# Patient Record
Sex: MALE | Race: OTHER | HISPANIC OR LATINO
[De-identification: names, ages, dates, MRNs, and addresses within clinical notes are randomized per-mention and may not be internally consistent; named-entity substitution may affect disease eponyms.]

---

## 2017-07-05 ENCOUNTER — APPOINTMENT (OUTPATIENT)
Dept: OTHER | Facility: CLINIC | Age: 62
End: 2017-07-05

## 2017-07-05 VITALS
HEART RATE: 76 BPM | OXYGEN SATURATION: 96 % | BODY MASS INDEX: 30.53 KG/M2 | SYSTOLIC BLOOD PRESSURE: 99 MMHG | DIASTOLIC BLOOD PRESSURE: 66 MMHG | WEIGHT: 190 LBS | HEIGHT: 66 IN

## 2017-07-06 LAB
ALBUMIN SERPL ELPH-MCNC: 4.6 G/DL
ALP BLD-CCNC: 54 U/L
ALT SERPL-CCNC: 31 U/L
ANION GAP SERPL CALC-SCNC: 16 MMOL/L
APPEARANCE: CLEAR
AST SERPL-CCNC: 18 U/L
BASOPHILS # BLD AUTO: 0.01 K/UL
BASOPHILS NFR BLD AUTO: 0.2 %
BILIRUB SERPL-MCNC: 0.5 MG/DL
BILIRUBIN URINE: NEGATIVE
BLOOD URINE: NEGATIVE
BUN SERPL-MCNC: 11 MG/DL
CALCIUM SERPL-MCNC: 9.3 MG/DL
CHLORIDE SERPL-SCNC: 101 MMOL/L
CHOLEST SERPL-MCNC: 126 MG/DL
CHOLEST/HDLC SERPL: 2.9 RATIO
CO2 SERPL-SCNC: 22 MMOL/L
COLOR: YELLOW
CREAT SERPL-MCNC: 0.73 MG/DL
EOSINOPHIL # BLD AUTO: 0.08 K/UL
EOSINOPHIL NFR BLD AUTO: 1.4 %
GLUCOSE QUALITATIVE U: 1000 MG/DL
GLUCOSE SERPL-MCNC: 231 MG/DL
HCT VFR BLD CALC: 43.7 %
HDLC SERPL-MCNC: 43 MG/DL
HGB BLD-MCNC: 14.7 G/DL
IMM GRANULOCYTES NFR BLD AUTO: 0.2 %
KETONES URINE: NEGATIVE
LDLC SERPL CALC-MCNC: 56 MG/DL
LEUKOCYTE ESTERASE URINE: NEGATIVE
LYMPHOCYTES # BLD AUTO: 1.91 K/UL
LYMPHOCYTES NFR BLD AUTO: 33.1 %
MAN DIFF?: NORMAL
MCHC RBC-ENTMCNC: 29.5 PG
MCHC RBC-ENTMCNC: 33.6 GM/DL
MCV RBC AUTO: 87.6 FL
MONOCYTES # BLD AUTO: 0.39 K/UL
MONOCYTES NFR BLD AUTO: 6.8 %
NEUTROPHILS # BLD AUTO: 3.37 K/UL
NEUTROPHILS NFR BLD AUTO: 58.3 %
NITRITE URINE: NEGATIVE
PH URINE: 6
PLATELET # BLD AUTO: 293 K/UL
POTASSIUM SERPL-SCNC: 4.2 MMOL/L
PROT SERPL-MCNC: 7.5 G/DL
PROTEIN URINE: NEGATIVE MG/DL
RBC # BLD: 4.99 M/UL
RBC # FLD: 12.8 %
SODIUM SERPL-SCNC: 139 MMOL/L
SPECIFIC GRAVITY URINE: 1.02
TRIGL SERPL-MCNC: 136 MG/DL
UROBILINOGEN URINE: NORMAL MG/DL
WBC # FLD AUTO: 5.77 K/UL

## 2017-10-04 ENCOUNTER — APPOINTMENT (OUTPATIENT)
Dept: OTHER | Facility: CLINIC | Age: 62
End: 2017-10-04
Payer: COMMERCIAL

## 2017-10-04 VITALS
TEMPERATURE: 97.7 F | OXYGEN SATURATION: 98 % | BODY MASS INDEX: 29.89 KG/M2 | HEART RATE: 67 BPM | DIASTOLIC BLOOD PRESSURE: 67 MMHG | SYSTOLIC BLOOD PRESSURE: 96 MMHG | WEIGHT: 186 LBS | HEIGHT: 66 IN

## 2017-10-04 PROCEDURE — 99214 OFFICE O/P EST MOD 30 MIN: CPT | Mod: 25

## 2017-10-04 PROCEDURE — 90686 IIV4 VACC NO PRSV 0.5 ML IM: CPT

## 2017-10-04 PROCEDURE — G0008: CPT

## 2018-01-17 ENCOUNTER — APPOINTMENT (OUTPATIENT)
Dept: OTHER | Facility: CLINIC | Age: 63
End: 2018-01-17
Payer: COMMERCIAL

## 2018-01-17 VITALS
HEART RATE: 73 BPM | DIASTOLIC BLOOD PRESSURE: 68 MMHG | RESPIRATION RATE: 14 BRPM | OXYGEN SATURATION: 99 % | SYSTOLIC BLOOD PRESSURE: 102 MMHG | BODY MASS INDEX: 28.61 KG/M2 | WEIGHT: 178 LBS | HEIGHT: 66 IN

## 2018-01-17 PROCEDURE — 99213 OFFICE O/P EST LOW 20 MIN: CPT

## 2018-05-31 ENCOUNTER — APPOINTMENT (OUTPATIENT)
Dept: OTHER | Facility: CLINIC | Age: 63
End: 2018-05-31
Payer: COMMERCIAL

## 2018-05-31 VITALS
BODY MASS INDEX: 28.28 KG/M2 | OXYGEN SATURATION: 99 % | DIASTOLIC BLOOD PRESSURE: 64 MMHG | SYSTOLIC BLOOD PRESSURE: 98 MMHG | HEART RATE: 74 BPM | HEIGHT: 66 IN | WEIGHT: 176 LBS | RESPIRATION RATE: 14 BRPM

## 2018-05-31 PROCEDURE — 99214 OFFICE O/P EST MOD 30 MIN: CPT

## 2018-08-07 ENCOUNTER — EMERGENCY (EMERGENCY)
Facility: HOSPITAL | Age: 63
LOS: 1 days | Discharge: HOME | End: 2018-08-07
Attending: EMERGENCY MEDICINE
Payer: COMMERCIAL

## 2018-08-07 VITALS
RESPIRATION RATE: 18 BRPM | OXYGEN SATURATION: 100 % | SYSTOLIC BLOOD PRESSURE: 103 MMHG | DIASTOLIC BLOOD PRESSURE: 55 MMHG | HEART RATE: 81 BPM | TEMPERATURE: 98 F

## 2018-08-07 VITALS
SYSTOLIC BLOOD PRESSURE: 99 MMHG | DIASTOLIC BLOOD PRESSURE: 65 MMHG | WEIGHT: 175.05 LBS | HEART RATE: 84 BPM | OXYGEN SATURATION: 97 % | HEIGHT: 66 IN | RESPIRATION RATE: 18 BRPM | TEMPERATURE: 99 F

## 2018-08-07 LAB
ALBUMIN SERPL ELPH-MCNC: 3.4 G/DL — LOW (ref 3.5–5)
ALP SERPL-CCNC: 65 U/L — SIGNIFICANT CHANGE UP (ref 40–120)
ALT FLD-CCNC: 30 U/L DA — SIGNIFICANT CHANGE UP (ref 10–60)
ANION GAP SERPL CALC-SCNC: 11 MMOL/L — SIGNIFICANT CHANGE UP (ref 5–17)
APPEARANCE UR: CLEAR — SIGNIFICANT CHANGE UP
AST SERPL-CCNC: 19 U/L — SIGNIFICANT CHANGE UP (ref 10–40)
BILIRUB SERPL-MCNC: 0.5 MG/DL — SIGNIFICANT CHANGE UP (ref 0.2–1.2)
BILIRUB UR-MCNC: NEGATIVE — SIGNIFICANT CHANGE UP
BUN SERPL-MCNC: 11 MG/DL — SIGNIFICANT CHANGE UP (ref 7–18)
CALCIUM SERPL-MCNC: 8.7 MG/DL — SIGNIFICANT CHANGE UP (ref 8.4–10.5)
CHLORIDE SERPL-SCNC: 103 MMOL/L — SIGNIFICANT CHANGE UP (ref 96–108)
CO2 SERPL-SCNC: 19 MMOL/L — LOW (ref 22–31)
COLOR SPEC: YELLOW — SIGNIFICANT CHANGE UP
CREAT SERPL-MCNC: 0.82 MG/DL — SIGNIFICANT CHANGE UP (ref 0.5–1.3)
DIFF PNL FLD: ABNORMAL
GLUCOSE SERPL-MCNC: 143 MG/DL — HIGH (ref 70–99)
GLUCOSE UR QL: 1000 MG/DL
HCT VFR BLD CALC: 48.4 % — SIGNIFICANT CHANGE UP (ref 39–50)
HGB BLD-MCNC: 16.7 G/DL — SIGNIFICANT CHANGE UP (ref 13–17)
KETONES UR-MCNC: ABNORMAL
LEUKOCYTE ESTERASE UR-ACNC: NEGATIVE — SIGNIFICANT CHANGE UP
LIDOCAIN IGE QN: 238 U/L — SIGNIFICANT CHANGE UP (ref 73–393)
LYMPHOCYTES # BLD AUTO: 12 % — LOW (ref 13–44)
MAGNESIUM SERPL-MCNC: 2.1 MG/DL — SIGNIFICANT CHANGE UP (ref 1.6–2.6)
MCHC RBC-ENTMCNC: 30 PG — SIGNIFICANT CHANGE UP (ref 27–34)
MCHC RBC-ENTMCNC: 34.4 GM/DL — SIGNIFICANT CHANGE UP (ref 32–36)
MCV RBC AUTO: 87.1 FL — SIGNIFICANT CHANGE UP (ref 80–100)
MONOCYTES NFR BLD AUTO: 8 % — SIGNIFICANT CHANGE UP (ref 2–14)
NEUTROPHILS NFR BLD AUTO: 49 % — SIGNIFICANT CHANGE UP (ref 43–77)
NITRITE UR-MCNC: NEGATIVE — SIGNIFICANT CHANGE UP
PH UR: 6 — SIGNIFICANT CHANGE UP (ref 5–8)
PLATELET # BLD AUTO: 222 K/UL — SIGNIFICANT CHANGE UP (ref 150–400)
POTASSIUM SERPL-MCNC: 2.9 MMOL/L — CRITICAL LOW (ref 3.5–5.3)
POTASSIUM SERPL-SCNC: 2.9 MMOL/L — CRITICAL LOW (ref 3.5–5.3)
PROT SERPL-MCNC: 8 G/DL — SIGNIFICANT CHANGE UP (ref 6–8.3)
PROT UR-MCNC: 30 MG/DL
RBC # BLD: 5.56 M/UL — SIGNIFICANT CHANGE UP (ref 4.2–5.8)
RBC # FLD: 11.4 % — SIGNIFICANT CHANGE UP (ref 10.3–14.5)
SODIUM SERPL-SCNC: 133 MMOL/L — LOW (ref 135–145)
SP GR SPEC: 1.01 — SIGNIFICANT CHANGE UP (ref 1.01–1.02)
UROBILINOGEN FLD QL: NEGATIVE — SIGNIFICANT CHANGE UP
WBC # BLD: 8.4 K/UL — SIGNIFICANT CHANGE UP (ref 3.8–10.5)
WBC # FLD AUTO: 8.4 K/UL — SIGNIFICANT CHANGE UP (ref 3.8–10.5)

## 2018-08-07 PROCEDURE — 74177 CT ABD & PELVIS W/CONTRAST: CPT

## 2018-08-07 PROCEDURE — 82962 GLUCOSE BLOOD TEST: CPT

## 2018-08-07 PROCEDURE — 99284 EMERGENCY DEPT VISIT MOD MDM: CPT

## 2018-08-07 PROCEDURE — 99284 EMERGENCY DEPT VISIT MOD MDM: CPT | Mod: 25

## 2018-08-07 PROCEDURE — 96374 THER/PROPH/DIAG INJ IV PUSH: CPT | Mod: XU

## 2018-08-07 PROCEDURE — 83690 ASSAY OF LIPASE: CPT

## 2018-08-07 PROCEDURE — 93005 ELECTROCARDIOGRAM TRACING: CPT

## 2018-08-07 PROCEDURE — 81001 URINALYSIS AUTO W/SCOPE: CPT

## 2018-08-07 PROCEDURE — 96375 TX/PRO/DX INJ NEW DRUG ADDON: CPT

## 2018-08-07 PROCEDURE — 74177 CT ABD & PELVIS W/CONTRAST: CPT | Mod: 26

## 2018-08-07 PROCEDURE — 83735 ASSAY OF MAGNESIUM: CPT

## 2018-08-07 PROCEDURE — 80053 COMPREHEN METABOLIC PANEL: CPT

## 2018-08-07 PROCEDURE — 85027 COMPLETE CBC AUTOMATED: CPT

## 2018-08-07 RX ORDER — CIPROFLOXACIN LACTATE 400MG/40ML
400 VIAL (ML) INTRAVENOUS ONCE
Qty: 0 | Refills: 0 | Status: COMPLETED | OUTPATIENT
Start: 2018-08-07 | End: 2018-08-07

## 2018-08-07 RX ORDER — POTASSIUM CHLORIDE 20 MEQ
10 PACKET (EA) ORAL ONCE
Qty: 0 | Refills: 0 | Status: COMPLETED | OUTPATIENT
Start: 2018-08-07 | End: 2018-08-07

## 2018-08-07 RX ORDER — METRONIDAZOLE 500 MG
500 TABLET ORAL ONCE
Qty: 0 | Refills: 0 | Status: COMPLETED | OUTPATIENT
Start: 2018-08-07 | End: 2018-08-07

## 2018-08-07 RX ORDER — POTASSIUM CHLORIDE 20 MEQ
40 PACKET (EA) ORAL ONCE
Qty: 0 | Refills: 0 | Status: COMPLETED | OUTPATIENT
Start: 2018-08-07 | End: 2018-08-07

## 2018-08-07 RX ORDER — SODIUM CHLORIDE 9 MG/ML
1000 INJECTION INTRAMUSCULAR; INTRAVENOUS; SUBCUTANEOUS ONCE
Qty: 0 | Refills: 0 | Status: COMPLETED | OUTPATIENT
Start: 2018-08-07 | End: 2018-08-07

## 2018-08-07 RX ORDER — METRONIDAZOLE 500 MG
1 TABLET ORAL
Qty: 21 | Refills: 0 | OUTPATIENT
Start: 2018-08-07 | End: 2018-08-13

## 2018-08-07 RX ORDER — MOXIFLOXACIN HYDROCHLORIDE TABLETS, 400 MG 400 MG/1
1 TABLET, FILM COATED ORAL
Qty: 14 | Refills: 0 | OUTPATIENT
Start: 2018-08-07 | End: 2018-08-13

## 2018-08-07 RX ADMIN — Medication 100 MILLIEQUIVALENT(S): at 13:08

## 2018-08-07 RX ADMIN — Medication 40 MILLIEQUIVALENT(S): at 13:08

## 2018-08-07 RX ADMIN — Medication 100 MILLIGRAM(S): at 14:43

## 2018-08-07 RX ADMIN — SODIUM CHLORIDE 1000 MILLILITER(S): 9 INJECTION INTRAMUSCULAR; INTRAVENOUS; SUBCUTANEOUS at 13:08

## 2018-08-07 RX ADMIN — Medication 10 MILLIEQUIVALENT(S): at 14:15

## 2018-08-07 RX ADMIN — SODIUM CHLORIDE 1000 MILLILITER(S): 9 INJECTION INTRAMUSCULAR; INTRAVENOUS; SUBCUTANEOUS at 11:28

## 2018-08-07 RX ADMIN — Medication 200 MILLIGRAM(S): at 14:00

## 2018-08-07 NOTE — ED PROVIDER NOTE - MEDICAL DECISION MAKING DETAILS
63 y/o M pt presents with diarrhea, weakness and abd pain. Will order CT, labs, fluids and reassess.

## 2018-08-07 NOTE — ED PROVIDER NOTE - OBJECTIVE STATEMENT
61 y/o M pt with no significant PMHx and no significant PSHx presents to the ED c/o copious non-bloody diarrhea x4 days. Pt was at [facility] a few days ago where he had a blood test and CXR with negative results. Pt reports associated weakness and abd pain with fever at home x a few days ago; no fever x today. Today, pt nearly fainted 2/2 his weakness, so he came to the ED. Pt denies recent travel, nausea, vomiting, melena or any other complaints. NKDA.

## 2018-08-07 NOTE — ED ADULT NURSE NOTE - CHIEF COMPLAINT QUOTE
biba s/p syncopal episode at work , pt reports diarrhea , fevers x 3 days , seen at [facility] 3 days ago for same . states did not feel better . Ems reports  pt hypotensive  at the scene

## 2018-08-07 NOTE — ED ADULT TRIAGE NOTE - CHIEF COMPLAINT QUOTE
biba s/p syncopal episode at work , pt reports diarrhea , fevers x 3 days , seen at [facility] 3 days ago for same . states did not feel better . Ems reports  pt hypotensive  at the scene

## 2018-09-14 ENCOUNTER — EMERGENCY (EMERGENCY)
Facility: HOSPITAL | Age: 63
LOS: 1 days | Discharge: HOME | End: 2018-09-14
Attending: EMERGENCY MEDICINE
Payer: COMMERCIAL

## 2018-09-14 VITALS
OXYGEN SATURATION: 97 % | TEMPERATURE: 98 F | WEIGHT: 175.05 LBS | SYSTOLIC BLOOD PRESSURE: 106 MMHG | HEART RATE: 77 BPM | DIASTOLIC BLOOD PRESSURE: 72 MMHG | HEIGHT: 66 IN | RESPIRATION RATE: 18 BRPM

## 2018-09-14 VITALS — WEIGHT: 175.05 LBS | HEIGHT: 66 IN

## 2018-09-14 DIAGNOSIS — Z98.890 OTHER SPECIFIED POSTPROCEDURAL STATES: Chronic | ICD-10-CM

## 2018-09-14 LAB
ALBUMIN SERPL ELPH-MCNC: 4.2 G/DL — SIGNIFICANT CHANGE UP (ref 3.3–5)
ALP SERPL-CCNC: 69 U/L — SIGNIFICANT CHANGE UP (ref 40–120)
ALT FLD-CCNC: 12 U/L — SIGNIFICANT CHANGE UP (ref 10–45)
ANION GAP SERPL CALC-SCNC: 12 MMOL/L — SIGNIFICANT CHANGE UP (ref 5–17)
APTT BLD: 30.6 SEC — SIGNIFICANT CHANGE UP (ref 27.5–37.4)
AST SERPL-CCNC: 13 U/L — SIGNIFICANT CHANGE UP (ref 10–40)
BASOPHILS # BLD AUTO: 0 K/UL — SIGNIFICANT CHANGE UP (ref 0–0.2)
BASOPHILS NFR BLD AUTO: 0.1 % — SIGNIFICANT CHANGE UP (ref 0–2)
BILIRUB SERPL-MCNC: 0.3 MG/DL — SIGNIFICANT CHANGE UP (ref 0.2–1.2)
BUN SERPL-MCNC: 15 MG/DL — SIGNIFICANT CHANGE UP (ref 7–23)
CALCIUM SERPL-MCNC: 9.6 MG/DL — SIGNIFICANT CHANGE UP (ref 8.4–10.5)
CHLORIDE SERPL-SCNC: 101 MMOL/L — SIGNIFICANT CHANGE UP (ref 96–108)
CO2 SERPL-SCNC: 20 MMOL/L — LOW (ref 22–31)
CREAT SERPL-MCNC: 0.93 MG/DL — SIGNIFICANT CHANGE UP (ref 0.5–1.3)
EOSINOPHIL # BLD AUTO: 0.1 K/UL — SIGNIFICANT CHANGE UP (ref 0–0.5)
EOSINOPHIL NFR BLD AUTO: 1.2 % — SIGNIFICANT CHANGE UP (ref 0–6)
GAS PNL BLDV: SIGNIFICANT CHANGE UP
GLUCOSE SERPL-MCNC: 109 MG/DL — HIGH (ref 70–99)
HCT VFR BLD CALC: 42.8 % — SIGNIFICANT CHANGE UP (ref 39–50)
HCT VFR BLD CALC: 43.6 % — SIGNIFICANT CHANGE UP (ref 39–50)
HGB BLD-MCNC: 14.6 G/DL — SIGNIFICANT CHANGE UP (ref 13–17)
HGB BLD-MCNC: 14.8 G/DL — SIGNIFICANT CHANGE UP (ref 13–17)
INR BLD: 1.19 RATIO — HIGH (ref 0.88–1.16)
LYMPHOCYTES # BLD AUTO: 3 K/UL — SIGNIFICANT CHANGE UP (ref 1–3.3)
LYMPHOCYTES # BLD AUTO: 30.6 % — SIGNIFICANT CHANGE UP (ref 13–44)
MCHC RBC-ENTMCNC: 29.7 PG — SIGNIFICANT CHANGE UP (ref 27–34)
MCHC RBC-ENTMCNC: 30.5 PG — SIGNIFICANT CHANGE UP (ref 27–34)
MCHC RBC-ENTMCNC: 33.6 GM/DL — SIGNIFICANT CHANGE UP (ref 32–36)
MCHC RBC-ENTMCNC: 34.6 GM/DL — SIGNIFICANT CHANGE UP (ref 32–36)
MCV RBC AUTO: 88.2 FL — SIGNIFICANT CHANGE UP (ref 80–100)
MCV RBC AUTO: 88.5 FL — SIGNIFICANT CHANGE UP (ref 80–100)
MONOCYTES # BLD AUTO: 0.6 K/UL — SIGNIFICANT CHANGE UP (ref 0–0.9)
MONOCYTES NFR BLD AUTO: 6.5 % — SIGNIFICANT CHANGE UP (ref 2–14)
NEUTROPHILS # BLD AUTO: 6 K/UL — SIGNIFICANT CHANGE UP (ref 1.8–7.4)
NEUTROPHILS NFR BLD AUTO: 61.6 % — SIGNIFICANT CHANGE UP (ref 43–77)
PLATELET # BLD AUTO: 336 K/UL — SIGNIFICANT CHANGE UP (ref 150–400)
PLATELET # BLD AUTO: 350 K/UL — SIGNIFICANT CHANGE UP (ref 150–400)
POTASSIUM SERPL-MCNC: 3.7 MMOL/L — SIGNIFICANT CHANGE UP (ref 3.5–5.3)
POTASSIUM SERPL-SCNC: 3.7 MMOL/L — SIGNIFICANT CHANGE UP (ref 3.5–5.3)
PROT SERPL-MCNC: 7.7 G/DL — SIGNIFICANT CHANGE UP (ref 6–8.3)
PROTHROM AB SERPL-ACNC: 13 SEC — HIGH (ref 9.8–12.7)
RBC # BLD: 4.85 M/UL — SIGNIFICANT CHANGE UP (ref 4.2–5.8)
RBC # BLD: 4.92 M/UL — SIGNIFICANT CHANGE UP (ref 4.2–5.8)
RBC # FLD: 12.8 % — SIGNIFICANT CHANGE UP (ref 10.3–14.5)
RBC # FLD: 12.9 % — SIGNIFICANT CHANGE UP (ref 10.3–14.5)
SODIUM SERPL-SCNC: 133 MMOL/L — LOW (ref 135–145)
WBC # BLD: 8.9 K/UL — SIGNIFICANT CHANGE UP (ref 3.8–10.5)
WBC # BLD: 9.7 K/UL — SIGNIFICANT CHANGE UP (ref 3.8–10.5)
WBC # FLD AUTO: 8.9 K/UL — SIGNIFICANT CHANGE UP (ref 3.8–10.5)
WBC # FLD AUTO: 9.7 K/UL — SIGNIFICANT CHANGE UP (ref 3.8–10.5)

## 2018-09-14 PROCEDURE — 85730 THROMBOPLASTIN TIME PARTIAL: CPT

## 2018-09-14 PROCEDURE — 82947 ASSAY GLUCOSE BLOOD QUANT: CPT

## 2018-09-14 PROCEDURE — 82803 BLOOD GASES ANY COMBINATION: CPT

## 2018-09-14 PROCEDURE — 84295 ASSAY OF SERUM SODIUM: CPT

## 2018-09-14 PROCEDURE — 99284 EMERGENCY DEPT VISIT MOD MDM: CPT

## 2018-09-14 PROCEDURE — 82435 ASSAY OF BLOOD CHLORIDE: CPT

## 2018-09-14 PROCEDURE — 82330 ASSAY OF CALCIUM: CPT

## 2018-09-14 PROCEDURE — 99283 EMERGENCY DEPT VISIT LOW MDM: CPT

## 2018-09-14 PROCEDURE — 83605 ASSAY OF LACTIC ACID: CPT

## 2018-09-14 PROCEDURE — 85014 HEMATOCRIT: CPT

## 2018-09-14 PROCEDURE — 85027 COMPLETE CBC AUTOMATED: CPT

## 2018-09-14 PROCEDURE — 85610 PROTHROMBIN TIME: CPT

## 2018-09-14 PROCEDURE — 80053 COMPREHEN METABOLIC PANEL: CPT

## 2018-09-14 PROCEDURE — 84132 ASSAY OF SERUM POTASSIUM: CPT

## 2018-09-14 NOTE — ED PROVIDER NOTE - OBJECTIVE STATEMENT
62M with DMII and prostate cancer (not treated) presenting with 1 episode of painless rectal bleeding today. When asked to quantify it, patient said "a lot" and it filled the bowl. Patient has never had this happen before. For the past month, patient has been having watery stools. Around a month ago, patient had an episode of multiple episodes of diarrhea, which lead to lightheadedness and syncope. Since then, patient has only had brown, watery stools. Patient had a colonoscopy 3 years ago which showed diverticulosis. Patient is scheduled to get another one next week. Denies abdominal pain, nausea, vomiting, constipation, iron supplements, hx of hemorrhoids, fever, chills, dysuria. Patient has been having frequency for the past few months, but no dysuria or hematuria. 62M with DMII and prostate cancer (not treated) presenting with 1 episode of painless rectal bleeding today. When asked to quantify it, patient said "a lot" and it filled the bowl. Patient has never had this happen before. For the past month, patient has been having watery stools. Around a month ago, patient had an episode of multiple episodes of diarrhea, which lead to lightheadedness and syncope. Since then, patient has only had brown, watery stools. Patient had a colonoscopy 3 years ago which showed diverticulosis. Patient is scheduled to get another one next week. Denies abdominal pain, nausea, vomiting, constipation, iron supplements, hx of hemorrhoids, fever, chills, dysuria, family hx of colon or rectal cancer. Patient has been having frequency for the past few months, but no dysuria or hematuria.

## 2018-09-14 NOTE — ED ADULT NURSE NOTE - OBJECTIVE STATEMENT
2045 pt 62m aox3 c/o blood in stool x 1 time today only, denies being on blood thinners, no complaints of pain,able to verbalize concerns no distress at this time/shailesh

## 2018-09-14 NOTE — ED PROVIDER NOTE - PLAN OF CARE
Thank you for visiting our Emergency Department.    Your diagnosis: diverticulosis    Discharge instructions:    - Please follow-up with your Primary Care Doctor in 2-3 days.     - Take any prescribed medications as instructed:    - Others:    - Be sure to return to the ED if you develop new or worsening symptoms.    - Specific signs and symptoms to be vigilant of: worsening abdominal pain, pain that radiates to the back or groin, pain that turns from vague to sharp, associated nausea or vomiting, worsening diarrhea or constipation, blood in the stool, black, tarry stools, excessive vomiting, blood or bile in the vomit, pain associated with lightheadedness, shortness of breath or chest pain.

## 2018-09-14 NOTE — ED PROVIDER NOTE - NS ED ROS FT
General: no fever, chills  HEENT: no headache, throat, ear or eye pain, cough, congestion  Cardiovascular: no chest pain, palpitations  Pulmonary: no shortness of breath, wheezing  Gastrointestinal: + bright red blood in the stool; no abdominal pain, nausea, vomiting, diarrhea, constipation  Neurologic: no headache, lightheadedness  MSK: no joint or muscle pain  Skin: no rashes  Genitourinary: no pain on urination, frequency, urgency, retention, hematuria  Heme: no active bleeding

## 2018-09-14 NOTE — ED PROVIDER NOTE - MEDICAL DECISION MAKING DETAILS
62M with DMII and prostate cancer (not treated) presenting with 1 episode of painless rectal bleeding today. Likely lower GI bleed, ddx includes diverticulosis, diverticulitis, colon cancer.

## 2018-09-14 NOTE — ED PROVIDER NOTE - PROGRESS NOTE DETAILS
Repeat HGB unchanged. No addt'l episodes of BRBPR. Well and comfortable. Suitable for DC with out-patient GI follow-up.

## 2018-09-14 NOTE — ED PROVIDER NOTE - CARE PLAN
Principal Discharge DX:	Diverticulosis  Assessment and plan of treatment:	Thank you for visiting our Emergency Department.    Your diagnosis: diverticulosis    Discharge instructions:    - Please follow-up with your Primary Care Doctor in 2-3 days.     - Take any prescribed medications as instructed:    - Others:    - Be sure to return to the ED if you develop new or worsening symptoms.    - Specific signs and symptoms to be vigilant of: worsening abdominal pain, pain that radiates to the back or groin, pain that turns from vague to sharp, associated nausea or vomiting, worsening diarrhea or constipation, blood in the stool, black, tarry stools, excessive vomiting, blood or bile in the vomit, pain associated with lightheadedness, shortness of breath or chest pain. Principal Discharge DX:	Bright red blood per rectum  Assessment and plan of treatment:	Thank you for visiting our Emergency Department.    Your diagnosis: diverticulosis    Discharge instructions:    - Please follow-up with your Primary Care Doctor in 2-3 days.     - Take any prescribed medications as instructed:    - Others:    - Be sure to return to the ED if you develop new or worsening symptoms.    - Specific signs and symptoms to be vigilant of: worsening abdominal pain, pain that radiates to the back or groin, pain that turns from vague to sharp, associated nausea or vomiting, worsening diarrhea or constipation, blood in the stool, black, tarry stools, excessive vomiting, blood or bile in the vomit, pain associated with lightheadedness, shortness of breath or chest pain.  Secondary Diagnosis:	Diverticulosis

## 2018-09-14 NOTE — ED PROVIDER NOTE - ATTENDING CONTRIBUTION TO CARE
63 y/o male with the above documented history and HPI who on exam appears well and comfortable. VSs noted, sclerae anicteric, MM's moist, neck supple, lungs CTA, cardiac sounds s/ audible m/r/g, abdomen soft, NT/ND, rectal as per Dr. Agee, extremities s/ asymmetry, skin s/ rash or jaundice and neurologically intact. Screening labs unremarkable. In light of his reported h/o diverticulosis we will trend his HGB. If his HGB is stable and he had no additional episodes of BRBPR, he should be suitable for DC to f/u with his own gastroenterologist. We will follow his repeat HGB, continue to observe, reassess and treat/dispo accordingly.

## 2018-09-14 NOTE — ED PROVIDER NOTE - PHYSICAL EXAMINATION
General: NAD, well-appearing, awake and responsive  HEENT: Airway patent, mucous membranes moist  Cardiovascular: RRR, S1 and S2 appreciable, no murmurs  Pulmonary: CTAB, no crackles, rales, rhonchi, or wheezing  Gastrointestinal: epigastric tenderness; abdomen nondistended, soft, no guarding or rebound tenderness, +BS; no external hemorrhoids on rectal exam  Neurologic: Alert and oriented, PERRLA, EOMI, CN II - XII intact, motor strength 5/5 b/l, sensation grossly intact, coordination via FTN intact  MSK: Moving 4 extremities  Skin: No obvious rashes, capillary refill < 2, warm and well-perfused

## 2018-09-15 RX ORDER — CANAGLIFLOZIN 100 MG/1
0 TABLET, FILM COATED ORAL
Qty: 0 | Refills: 0 | COMMUNITY

## 2018-09-15 RX ORDER — OMEPRAZOLE 10 MG/1
0 CAPSULE, DELAYED RELEASE ORAL
Qty: 0 | Refills: 0 | COMMUNITY

## 2018-09-15 RX ORDER — ATORVASTATIN CALCIUM 80 MG/1
0 TABLET, FILM COATED ORAL
Qty: 0 | Refills: 0 | COMMUNITY

## 2018-09-15 RX ORDER — LEVOTHYROXINE SODIUM 125 MCG
0 TABLET ORAL
Qty: 0 | Refills: 0 | COMMUNITY

## 2018-09-15 RX ORDER — RAMIPRIL 5 MG
0 CAPSULE ORAL
Qty: 0 | Refills: 0 | COMMUNITY

## 2018-09-26 ENCOUNTER — APPOINTMENT (OUTPATIENT)
Dept: OTHER | Facility: CLINIC | Age: 63
End: 2018-09-26
Payer: COMMERCIAL

## 2018-09-26 VITALS
WEIGHT: 172 LBS | DIASTOLIC BLOOD PRESSURE: 68 MMHG | RESPIRATION RATE: 16 BRPM | OXYGEN SATURATION: 100 % | BODY MASS INDEX: 27.64 KG/M2 | HEIGHT: 66 IN | HEART RATE: 78 BPM | SYSTOLIC BLOOD PRESSURE: 102 MMHG

## 2018-09-26 PROBLEM — E11.9 TYPE 2 DIABETES MELLITUS WITHOUT COMPLICATIONS: Chronic | Status: ACTIVE | Noted: 2018-09-14

## 2018-09-26 PROBLEM — E78.00 PURE HYPERCHOLESTEROLEMIA, UNSPECIFIED: Chronic | Status: ACTIVE | Noted: 2018-09-14

## 2018-09-26 PROBLEM — K21.9 GASTRO-ESOPHAGEAL REFLUX DISEASE WITHOUT ESOPHAGITIS: Chronic | Status: ACTIVE | Noted: 2018-09-14

## 2018-09-26 PROBLEM — I10 ESSENTIAL (PRIMARY) HYPERTENSION: Chronic | Status: ACTIVE | Noted: 2018-09-14

## 2018-09-26 PROCEDURE — 94010 BREATHING CAPACITY TEST: CPT

## 2018-09-26 PROCEDURE — 99214 OFFICE O/P EST MOD 30 MIN: CPT | Mod: 25

## 2018-09-26 PROCEDURE — 96150: CPT

## 2018-09-26 PROCEDURE — 99396 PREV VISIT EST AGE 40-64: CPT

## 2018-09-26 RX ORDER — OMEPRAZOLE 20 MG/1
20 CAPSULE, DELAYED RELEASE ORAL DAILY
Qty: 30 | Refills: 6 | Status: COMPLETED | COMMUNITY
Start: 2018-05-31 | End: 2018-09-26

## 2018-09-27 LAB
ALBUMIN SERPL ELPH-MCNC: 4.7 G/DL
ALP BLD-CCNC: 71 U/L
ALT SERPL-CCNC: 15 U/L
ANION GAP SERPL CALC-SCNC: 19 MMOL/L
APPEARANCE: CLEAR
AST SERPL-CCNC: 12 U/L
BASOPHILS # BLD AUTO: 0.01 K/UL
BASOPHILS NFR BLD AUTO: 0.1 %
BILIRUB SERPL-MCNC: 0.5 MG/DL
BILIRUBIN URINE: NEGATIVE
BLOOD URINE: NEGATIVE
BUN SERPL-MCNC: 13 MG/DL
CALCIUM SERPL-MCNC: 9.2 MG/DL
CHLORIDE SERPL-SCNC: 99 MMOL/L
CHOLEST SERPL-MCNC: 97 MG/DL
CHOLEST/HDLC SERPL: 2.8 RATIO
CO2 SERPL-SCNC: 22 MMOL/L
COLOR: YELLOW
CREAT SERPL-MCNC: 0.7 MG/DL
EOSINOPHIL # BLD AUTO: 0.12 K/UL
EOSINOPHIL NFR BLD AUTO: 1.6 %
GLUCOSE QUALITATIVE U: 1000 MG/DL
GLUCOSE SERPL-MCNC: 131 MG/DL
HCT VFR BLD CALC: 43.9 %
HDLC SERPL-MCNC: 35 MG/DL
HGB BLD-MCNC: 14.6 G/DL
IMM GRANULOCYTES NFR BLD AUTO: 0.1 %
KETONES URINE: NEGATIVE
LDLC SERPL CALC-MCNC: 47 MG/DL
LEUKOCYTE ESTERASE URINE: NEGATIVE
LYMPHOCYTES # BLD AUTO: 2.13 K/UL
LYMPHOCYTES NFR BLD AUTO: 28.9 %
MAN DIFF?: NORMAL
MCHC RBC-ENTMCNC: 29.2 PG
MCHC RBC-ENTMCNC: 33.3 GM/DL
MCV RBC AUTO: 87.8 FL
MONOCYTES # BLD AUTO: 0.58 K/UL
MONOCYTES NFR BLD AUTO: 7.9 %
NEUTROPHILS # BLD AUTO: 4.53 K/UL
NEUTROPHILS NFR BLD AUTO: 61.4 %
NITRITE URINE: NEGATIVE
PH URINE: 6.5
PLATELET # BLD AUTO: 312 K/UL
POTASSIUM SERPL-SCNC: 4 MMOL/L
PROT SERPL-MCNC: 7.3 G/DL
PROTEIN URINE: NEGATIVE MG/DL
RBC # BLD: 5 M/UL
RBC # FLD: 13.7 %
SODIUM SERPL-SCNC: 140 MMOL/L
SPECIFIC GRAVITY URINE: 1.03
TRIGL SERPL-MCNC: 75 MG/DL
UROBILINOGEN URINE: NEGATIVE MG/DL
WBC # FLD AUTO: 7.38 K/UL

## 2018-10-22 ENCOUNTER — RX RENEWAL (OUTPATIENT)
Age: 63
End: 2018-10-22

## 2019-01-23 ENCOUNTER — APPOINTMENT (OUTPATIENT)
Dept: OTHER | Facility: CLINIC | Age: 64
End: 2019-01-23
Payer: COMMERCIAL

## 2019-01-23 VITALS
RESPIRATION RATE: 16 BRPM | BODY MASS INDEX: 27.64 KG/M2 | OXYGEN SATURATION: 98 % | DIASTOLIC BLOOD PRESSURE: 66 MMHG | HEIGHT: 66 IN | SYSTOLIC BLOOD PRESSURE: 103 MMHG | WEIGHT: 172 LBS | HEART RATE: 72 BPM

## 2019-01-23 PROCEDURE — 99213 OFFICE O/P EST LOW 20 MIN: CPT

## 2019-01-23 NOTE — ASSESSMENT
[FreeTextEntry1] : a. overall stable. \par p. meds renewed, rtc in 3 mo, c4 completed, partially disabled. pt to explore new cpap device, see if he tolerates this better.

## 2019-01-23 NOTE — HEALTH RISK ASSESSMENT
[Patient reported colonoscopy was abnormal] : Patient reported colonoscopy was abnormal [ColonoscopyDate] : 10/2018 [ColonoscopyComments] : colitis

## 2019-01-23 NOTE — PHYSICAL EXAM
[General Appearance - Alert] : alert [General Appearance - In No Acute Distress] : in no acute distress [General Appearance - Well Nourished] : well nourished [General Appearance - Well Developed] : well developed [Sclera] : the sclera and conjunctiva were normal [PERRL With Normal Accommodation] : pupils were equal in size, round, and reactive to light [Extraocular Movements] : extraocular movements were intact [Outer Ear] : the ears and nose were normal in appearance [Neck Appearance] : the appearance of the neck was normal [Neck Cervical Mass (___cm)] : no neck mass was observed [Jugular Venous Distention Increased] : there was no jugular-venous distention [Thyroid Diffuse Enlargement] : the thyroid was not enlarged [Respiration, Rhythm And Depth] : normal respiratory rhythm and effort [Exaggerated Use Of Accessory Muscles For Inspiration] : no accessory muscle use [Auscultation Breath Sounds / Voice Sounds] : lungs were clear to auscultation bilaterally [Chest Palpation] : palpation of the chest revealed no abnormalities [Apical Impulse] : the apical impulse was normal [Heart Rate And Rhythm] : heart rate was normal and rhythm regular [Heart Sounds] : normal S1 and S2 [Heart Sounds Gallop] : no gallops [Murmurs] : no murmurs [Edema] : there was no peripheral edema [Bowel Sounds] : normal bowel sounds [Abdomen Soft] : soft [Abdomen Tenderness] : non-tender [] : no hepato-splenomegaly [Abdomen Mass (___ Cm)] : no abdominal mass palpated [Cervical Lymph Nodes Enlarged Posterior Bilaterally] : posterior cervical [Cervical Lymph Nodes Enlarged Anterior Bilaterally] : anterior cervical [Supraclavicular Lymph Nodes Enlarged Bilaterally] : supraclavicular [Axillary Lymph Nodes Enlarged Bilaterally] : axillary [FreeTextEntry1] : puffy turbiantes, symmetrcall occluding the lumens in a good 100% or so, no secretions, mucosa is pale. notender at sinues. oropharynx is very red, no swollen, no secretions.

## 2019-01-23 NOTE — HISTORY OF PRESENT ILLNESS
[FreeTextEntry1] : certified for sinusitis, della and prostate ca\par s. overall patient's conditions are stable. he is not using cpap machine, does not report excessive daytime somnolence,a s he did not tolerate the mask, but would like to try a nasal piece. under care for his prostate ca with pmd. has history of diabetes, under follow up with pmd. had nasal cauterization with an ent. \par patient is working as a .\par wc: no news.

## 2019-07-10 ENCOUNTER — APPOINTMENT (OUTPATIENT)
Dept: OTHER | Facility: CLINIC | Age: 64
End: 2019-07-10
Payer: COMMERCIAL

## 2019-07-10 VITALS
DIASTOLIC BLOOD PRESSURE: 63 MMHG | WEIGHT: 172 LBS | HEART RATE: 83 BPM | OXYGEN SATURATION: 97 % | SYSTOLIC BLOOD PRESSURE: 106 MMHG | BODY MASS INDEX: 27.64 KG/M2 | HEIGHT: 66 IN | RESPIRATION RATE: 16 BRPM

## 2019-07-10 PROCEDURE — 99213 OFFICE O/P EST LOW 20 MIN: CPT

## 2019-07-10 NOTE — REASON FOR VISIT
[Follow-Up] : a follow-up visit [FreeTextEntry1] : visit conducted in [facility]

## 2019-07-10 NOTE — ASSESSMENT
[FreeTextEntry1] : a. overall stable, persistent nasal symptoms with are chronic and permanent, need onging medication but i don’t think they will healed as to how he was before the injury.\par p. meds renewed, recommended to use ayr gel see if this helps. c4 completed, rtc in 4 mo. partiall disabled.

## 2019-07-10 NOTE — PHYSICAL EXAM
[General Appearance - Alert] : alert [General Appearance - In No Acute Distress] : in no acute distress [General Appearance - Well Nourished] : well nourished [General Appearance - Well Developed] : well developed [Sclera] : the sclera and conjunctiva were normal [Extraocular Movements] : extraocular movements were intact [PERRL With Normal Accommodation] : pupils were equal in size, round, and reactive to light [Outer Ear] : the ears and nose were normal in appearance [FreeTextEntry1] : no swelling of turbinates but clear secretions b. no tender at sinuses. oropharynx is reddish/irritated throughout, no secretions.  [Neck Appearance] : the appearance of the neck was normal [Jugular Venous Distention Increased] : there was no jugular-venous distention [Neck Cervical Mass (___cm)] : no neck mass was observed [Thyroid Diffuse Enlargement] : the thyroid was not enlarged [] : no respiratory distress [Exaggerated Use Of Accessory Muscles For Inspiration] : no accessory muscle use [Respiration, Rhythm And Depth] : normal respiratory rhythm and effort [Chest Palpation] : palpation of the chest revealed no abnormalities [Apical Impulse] : the apical impulse was normal [Auscultation Breath Sounds / Voice Sounds] : lungs were clear to auscultation bilaterally [Heart Sounds] : normal S1 and S2 [Heart Rate And Rhythm] : heart rate was normal and rhythm regular [Heart Sounds Gallop] : no gallops [Cervical Lymph Nodes Enlarged Posterior Bilaterally] : posterior cervical [Murmurs] : no murmurs [Axillary Lymph Nodes Enlarged Bilaterally] : axillary [Supraclavicular Lymph Nodes Enlarged Bilaterally] : supraclavicular [Cervical Lymph Nodes Enlarged Anterior Bilaterally] : anterior cervical

## 2019-07-10 NOTE — HISTORY OF PRESENT ILLNESS
[FreeTextEntry1] : certified for sinusitis, della and prostate ca\par s. patient continues care under his ent, underwent freezing treatment but still bleeds on occasions. no symtpoms of daytime somnolence, does not tolerate cpap machine. prostate ca under f/u with pmd. \par pt is working as .\par wc: case approved.

## 2019-10-02 ENCOUNTER — APPOINTMENT (OUTPATIENT)
Dept: OTHER | Facility: CLINIC | Age: 64
End: 2019-10-02
Payer: COMMERCIAL

## 2019-10-02 VITALS
DIASTOLIC BLOOD PRESSURE: 67 MMHG | WEIGHT: 180 LBS | BODY MASS INDEX: 28.25 KG/M2 | OXYGEN SATURATION: 97 % | TEMPERATURE: 97.9 F | HEIGHT: 67 IN | RESPIRATION RATE: 17 BRPM | SYSTOLIC BLOOD PRESSURE: 101 MMHG | HEART RATE: 80 BPM

## 2019-10-02 PROCEDURE — 99396 PREV VISIT EST AGE 40-64: CPT | Mod: 25

## 2019-10-02 PROCEDURE — 99214 OFFICE O/P EST MOD 30 MIN: CPT | Mod: 25

## 2019-10-02 PROCEDURE — 94010 BREATHING CAPACITY TEST: CPT

## 2019-10-02 RX ORDER — NAPHAZOLINE HCL/PHENIRAMINE .0268-.315
6.5 DROPS OPHTHALMIC (EYE)
Qty: 1 | Refills: 1 | Status: COMPLETED | COMMUNITY
Start: 2018-09-26 | End: 2019-10-02

## 2019-10-02 RX ORDER — BUDESONIDE 32 UG/1
32 SPRAY, METERED NASAL DAILY
Qty: 1 | Refills: 6 | Status: COMPLETED | COMMUNITY
Start: 2017-10-04 | End: 2019-10-02

## 2019-10-02 NOTE — DISCUSSION/SUMMARY
[Please See Note in Chart and Documentation in Trial DB] : Please see note in chart and documentation in Trial DB. [Patient seen for WTC Monitoring ___] : Patient was seen for WTC monitoring [unfilled] [FreeTextEntry3] : certified for sinusitis, della and prostate ca\par s. patient has been stable overall. reports occasional nasal bleeding. he got a freezing treatment for his sinuses and this resulted in much improvement. he is sleeping better, does not report so much daytime somnolence. \par on f/u for his prostate ca with pmd, is to get a mri and then will have a repeated biospy as per pt's reprot. \par continues working as a . \par wc: case approved. \par o. Constitutional: alert, in no acute distress, well nourished and well developed. \par Eyes: the sclera and conjunctiva were normal, pupils were equal in size, round, and reactive to light and extraocular movements were intact. \par ENT: the ears and nose were normal in appearance. No swelling of turbinates, no secretions. no tender at sinuses. oropharynx is overall clear. \par Neck: the appearance of the neck was normal, the neck was supple, no neck mass was observed and the thyroid was not enlarged . there was no jugular-venous distention. \par Pulmonary: no respiratory distress, normal respiratory rhythm and effort, no accessory muscle use, lungs were clear to auscultation bilaterally and palpation of the chest revealed no abnormalities. \par Heart: the apical impulse was normal, heart rate was normal and rhythm regular, normal S1 and S2, no gallops and no murmurs. \par Vascular:. there was no peripheral edema. \par Abdomen: normal bowel sounds, soft, non-tender, no hepato-splenomegaly and no abdominal mass palpated. \par Lymphatics: The posterior cervical, anterior cervical, supraclavicular and axillary nodes were non-tender and normal size. \par a and p. documentaton completed.

## 2019-10-02 NOTE — PHYSICAL EXAM
[General Appearance - Alert] : alert [General Appearance - In No Acute Distress] : in no acute distress [General Appearance - Well Developed] : well developed [General Appearance - Well Nourished] : well nourished [Sclera] : the sclera and conjunctiva were normal [PERRL With Normal Accommodation] : pupils were equal in size, round, and reactive to light [Extraocular Movements] : extraocular movements were intact [Outer Ear] : the ears and nose were normal in appearance [FreeTextEntry1] : no swelling of turbinates, no secretions. no tender at sinuses. oropharynx is overall clear.  [Neck Appearance] : the appearance of the neck was normal [Neck Cervical Mass (___cm)] : no neck mass was observed [Jugular Venous Distention Increased] : there was no jugular-venous distention [Thyroid Diffuse Enlargement] : the thyroid was not enlarged [Respiration, Rhythm And Depth] : normal respiratory rhythm and effort [Exaggerated Use Of Accessory Muscles For Inspiration] : no accessory muscle use [Auscultation Breath Sounds / Voice Sounds] : lungs were clear to auscultation bilaterally [Chest Palpation] : palpation of the chest revealed no abnormalities [Apical Impulse] : the apical impulse was normal [Heart Rate And Rhythm] : heart rate was normal and rhythm regular [Heart Sounds] : normal S1 and S2 [Heart Sounds Gallop] : no gallops [Murmurs] : no murmurs [Edema] : there was no peripheral edema [Bowel Sounds] : normal bowel sounds [Abdomen Soft] : soft [Abdomen Tenderness] : non-tender [] : no hepato-splenomegaly [Abdomen Mass (___ Cm)] : no abdominal mass palpated [Cervical Lymph Nodes Enlarged Posterior Bilaterally] : posterior cervical [Supraclavicular Lymph Nodes Enlarged Bilaterally] : supraclavicular [Cervical Lymph Nodes Enlarged Anterior Bilaterally] : anterior cervical [Axillary Lymph Nodes Enlarged Bilaterally] : axillary

## 2019-10-02 NOTE — ASSESSMENT
[FreeTextEntry1] : sergei today: fvc 2.85, 71%; fev1 2.40, 78%;ratio 84, 110%; fet 6 sec; flow volume normal. lss of 20ml/yr fev1 since 2007 (fev 2007: 2.64)\par a. overall stabel. occasionla nasal bleeding. pt did nto tolerate cpap machine, he got a freezing treatment for his sinus problem wihc helps. on f/u for prostate ca.\par p. meds renewed. will change nasal steroid to ipratropium, see if this helps for bleeding. c4 completed, partially disabled, rtc in 4 mo.

## 2019-10-02 NOTE — PHYSICAL EXAM
[General Appearance - Alert] : alert [General Appearance - In No Acute Distress] : in no acute distress [General Appearance - Well Nourished] : well nourished [General Appearance - Well Developed] : well developed [Sclera] : the sclera and conjunctiva were normal [PERRL With Normal Accommodation] : pupils were equal in size, round, and reactive to light [Extraocular Movements] : extraocular movements were intact [Outer Ear] : the ears and nose were normal in appearance [FreeTextEntry1] : no swelling of turbinates, no secretions. no tender at sinuses. oropharynx is overall clear.  [Neck Appearance] : the appearance of the neck was normal [Neck Cervical Mass (___cm)] : no neck mass was observed [Jugular Venous Distention Increased] : there was no jugular-venous distention [Thyroid Diffuse Enlargement] : the thyroid was not enlarged [Respiration, Rhythm And Depth] : normal respiratory rhythm and effort [Auscultation Breath Sounds / Voice Sounds] : lungs were clear to auscultation bilaterally [Exaggerated Use Of Accessory Muscles For Inspiration] : no accessory muscle use [Chest Palpation] : palpation of the chest revealed no abnormalities [Apical Impulse] : the apical impulse was normal [Heart Sounds] : normal S1 and S2 [Heart Rate And Rhythm] : heart rate was normal and rhythm regular [Heart Sounds Gallop] : no gallops [Murmurs] : no murmurs [Edema] : there was no peripheral edema [Bowel Sounds] : normal bowel sounds [Abdomen Soft] : soft [Abdomen Tenderness] : non-tender [] : no hepato-splenomegaly [Abdomen Mass (___ Cm)] : no abdominal mass palpated [Cervical Lymph Nodes Enlarged Posterior Bilaterally] : posterior cervical [Supraclavicular Lymph Nodes Enlarged Bilaterally] : supraclavicular [Cervical Lymph Nodes Enlarged Anterior Bilaterally] : anterior cervical [Axillary Lymph Nodes Enlarged Bilaterally] : axillary

## 2019-10-03 LAB
ALBUMIN SERPL ELPH-MCNC: 4.8 G/DL
ALP BLD-CCNC: 63 U/L
ALT SERPL-CCNC: 17 U/L
ANION GAP SERPL CALC-SCNC: 17 MMOL/L
APPEARANCE: CLEAR
AST SERPL-CCNC: 14 U/L
BASOPHILS # BLD AUTO: 0.03 K/UL
BASOPHILS NFR BLD AUTO: 0.4 %
BILIRUB SERPL-MCNC: 0.4 MG/DL
BILIRUBIN URINE: NEGATIVE
BLOOD URINE: NEGATIVE
BUN SERPL-MCNC: 14 MG/DL
CALCIUM SERPL-MCNC: 9.4 MG/DL
CHLORIDE SERPL-SCNC: 102 MMOL/L
CHOLEST SERPL-MCNC: 105 MG/DL
CHOLEST/HDLC SERPL: 2.8 RATIO
CO2 SERPL-SCNC: 22 MMOL/L
COLOR: NORMAL
CREAT SERPL-MCNC: 0.72 MG/DL
EOSINOPHIL # BLD AUTO: 0.06 K/UL
EOSINOPHIL NFR BLD AUTO: 0.8 %
GLUCOSE QUALITATIVE U: ABNORMAL
GLUCOSE SERPL-MCNC: 116 MG/DL
HCT VFR BLD CALC: 48.1 %
HDLC SERPL-MCNC: 37 MG/DL
HGB BLD-MCNC: 15.5 G/DL
IMM GRANULOCYTES NFR BLD AUTO: 0.1 %
KETONES URINE: NEGATIVE
LDLC SERPL CALC-MCNC: 40 MG/DL
LEUKOCYTE ESTERASE URINE: NEGATIVE
LYMPHOCYTES # BLD AUTO: 2.13 K/UL
LYMPHOCYTES NFR BLD AUTO: 29.5 %
MAN DIFF?: NORMAL
MCHC RBC-ENTMCNC: 29.7 PG
MCHC RBC-ENTMCNC: 32.2 GM/DL
MCV RBC AUTO: 92.1 FL
MONOCYTES # BLD AUTO: 0.43 K/UL
MONOCYTES NFR BLD AUTO: 5.9 %
NEUTROPHILS # BLD AUTO: 4.57 K/UL
NEUTROPHILS NFR BLD AUTO: 63.3 %
NITRITE URINE: NEGATIVE
PH URINE: 6
PLATELET # BLD AUTO: 291 K/UL
POTASSIUM SERPL-SCNC: 4.1 MMOL/L
PROT SERPL-MCNC: 7 G/DL
PROTEIN URINE: NEGATIVE
RBC # BLD: 5.22 M/UL
RBC # FLD: 13 %
SODIUM SERPL-SCNC: 141 MMOL/L
SPECIFIC GRAVITY URINE: 1.04
TRIGL SERPL-MCNC: 141 MG/DL
UROBILINOGEN URINE: NORMAL
WBC # FLD AUTO: 7.23 K/UL

## 2019-11-20 ENCOUNTER — RX RENEWAL (OUTPATIENT)
Age: 64
End: 2019-11-20

## 2020-02-12 ENCOUNTER — APPOINTMENT (OUTPATIENT)
Dept: OTHER | Facility: CLINIC | Age: 65
End: 2020-02-12
Payer: COMMERCIAL

## 2020-02-12 VITALS
HEIGHT: 67 IN | WEIGHT: 180 LBS | OXYGEN SATURATION: 98 % | DIASTOLIC BLOOD PRESSURE: 68 MMHG | RESPIRATION RATE: 16 BRPM | HEART RATE: 74 BPM | TEMPERATURE: 98.5 F | SYSTOLIC BLOOD PRESSURE: 103 MMHG | BODY MASS INDEX: 28.25 KG/M2

## 2020-02-12 PROCEDURE — 99213 OFFICE O/P EST LOW 20 MIN: CPT

## 2020-02-12 NOTE — PHYSICAL EXAM
[General Appearance - Alert] : alert [General Appearance - In No Acute Distress] : in no acute distress [General Appearance - Well Nourished] : well nourished [General Appearance - Well Developed] : well developed [Sclera] : the sclera and conjunctiva were normal [PERRL With Normal Accommodation] : pupils were equal in size, round, and reactive to light [Extraocular Movements] : extraocular movements were intact [Outer Ear] : the ears and nose were normal in appearance [FreeTextEntry1] : no swelling of turbinates, no secretions. area of bleeding right nostril, punctuate. no tender at sinuses. oropharynx is overall clear.  [Neck Appearance] : the appearance of the neck was normal [Neck Cervical Mass (___cm)] : no neck mass was observed [Jugular Venous Distention Increased] : there was no jugular-venous distention [Thyroid Diffuse Enlargement] : the thyroid was not enlarged [] : no respiratory distress [Respiration, Rhythm And Depth] : normal respiratory rhythm and effort [Exaggerated Use Of Accessory Muscles For Inspiration] : no accessory muscle use [Auscultation Breath Sounds / Voice Sounds] : lungs were clear to auscultation bilaterally [Chest Palpation] : palpation of the chest revealed no abnormalities [Apical Impulse] : the apical impulse was normal [Heart Rate And Rhythm] : heart rate was normal and rhythm regular [Heart Sounds] : normal S1 and S2 [Heart Sounds Gallop] : no gallops [Murmurs] : no murmurs [Cervical Lymph Nodes Enlarged Posterior Bilaterally] : posterior cervical [Cervical Lymph Nodes Enlarged Anterior Bilaterally] : anterior cervical [Supraclavicular Lymph Nodes Enlarged Bilaterally] : supraclavicular [Axillary Lymph Nodes Enlarged Bilaterally] : axillary

## 2020-02-12 NOTE — HISTORY OF PRESENT ILLNESS
[FreeTextEntry1] : certified for sinusitis, della and prostate ca\par s. patient has been stable overall. ipratropium is helping better than previous medication. only occasional nasal bleeding when blowing his nose hard. he is sleeping better, does not report so much daytime somnolence. \par on f/u for his prostate ca with pmd. was recommended a prostate biopsy by his pmd but patient elected not to attend due to the fact that he was told his mri was normal. \par continues working as a . pt has dm. \par wc: case approved.

## 2020-02-12 NOTE — ASSESSMENT
[FreeTextEntry1] : a. overall stable. chronic, permanent conditions. under follow up for prostate ca.\par p. meds renewed. continue follow up for prostate ca but will refer pt under the program for a second opinion re: need for biopsy? c4 completed, partially disabled. rtc in 3 mo.

## 2020-04-14 ENCOUNTER — APPOINTMENT (OUTPATIENT)
Dept: UROLOGY | Facility: CLINIC | Age: 65
End: 2020-04-14

## 2020-04-15 ENCOUNTER — APPOINTMENT (OUTPATIENT)
Dept: UROLOGY | Facility: CLINIC | Age: 65
End: 2020-04-15
Payer: COMMERCIAL

## 2020-04-15 PROCEDURE — 99204 OFFICE O/P NEW MOD 45 MIN: CPT | Mod: 95

## 2020-04-15 NOTE — PHYSICAL EXAM
[General Appearance - Well Developed] : well developed [General Appearance - Well Nourished] : well nourished [Normal Appearance] : normal appearance [Well Groomed] : well groomed [General Appearance - In No Acute Distress] : no acute distress [] : no respiratory distress [Oriented To Time, Place, And Person] : oriented to person, place, and time [Affect] : the affect was normal [Mood] : the mood was normal [Not Anxious] : not anxious

## 2020-04-17 NOTE — ASSESSMENT
[FreeTextEntry1] : Very pleasant 64-year-old gentleman who presents for evaluation of prostate cancer\par -Obtain records from Dr. Alvarez and Dr. Gastelum regarding treatment thus far\par -Request records from [facility] for MRI\par -We discussed the normal course for active surveillance\par -We discussed the need to perform a repeat prostate biopsy, however given recent MRI performed approximately 4 months ago, we discussed doing this in approximately 6-10 months\par -We discussed the advantages of a transperineal biopsy as opposed to a transrectal biopsy\par -Given current concern for coated 19, we will schedule a followup visit in the office in 3 months with KEVEN\par \par -Addendum- I personally reviewed the patient's MRI from [facility] which demonstrated PIRAD 1 suspicion score\par

## 2020-04-17 NOTE — HISTORY OF PRESENT ILLNESS
[Other Location: e.g. School (Enter Location, City,State)___] : at [unfilled], at the time of the visit. [Home] : at home, [unfilled] , at the time of the visit. [Other Location: e.g. Home (Enter Location, City,State)___] : at [unfilled] [Patient] : the patient [Self] : self [FreeTextEntry1] : The patient-doctor relationship has been established in a face to face fashion via real time video/audio HIPAA compliant communication using telemedicine software.  The patient's identity has been confirmed.  The patient was previously emailed a copy of the telemedicine consent.  They have had a chance to review and has now given verbal consent and has requested care to be assessed and treated through telemedicine and understands there maybe limitations in this process and they may need further follow up care in the office and or hospital settings.   \par \par Verbal consent given on 04/15/2020 at 15:34 by PAIGE SETH, []\par \par Very pleasant 64-year-old gentleman who presents for evaluation of prostate cancer. Patient was diagnosed with prostate cancer 2 years ago. Pathology at that time demonstrated Paz 6 prostate cancer in one core. He was recommended to start active surveillance at that time which she elected to do. He reports no problems since then. He reports that his highest PSA was 6.71. He had an MRI recently [facility] and was told that the result was excellent. He does not know the PIRAD score of this MRI. He denies urinary complaints. No nocturia. No dysuria. No flank pain nausea pubic pain. No family history of prostate cancer. He does not smoke. [Urinary Retention] : no urinary retention [Urinary Urgency] : no urinary urgency [Urinary Frequency] : no urinary frequency [Nocturia] : no nocturia [Straining] : no straining [Weak Stream] : no weak stream [Dysuria] : no dysuria [Hematuria - Gross] : no gross hematuria [Bladder Spasm] : no bladder spasm [Abdominal Pain] : no abdominal pain [Flank Pain] : no flank pain [Fever] : no fever [Fatigue] : no fatigue [Nausea] : no nausea

## 2020-04-17 NOTE — HISTORY OF PRESENT ILLNESS
[Other Location: e.g. School (Enter Location, City,State)___] : at [unfilled], at the time of the visit. [Home] : at home, [unfilled] , at the time of the visit. [Other Location: e.g. Home (Enter Location, City,State)___] : at [unfilled] [Patient] : the patient [Self] : self [FreeTextEntry1] : The patient-doctor relationship has been established in a face to face fashion via real time video/audio HIPAA compliant communication using telemedicine software.  The patient's identity has been confirmed.  The patient was previously emailed a copy of the telemedicine consent.  They have had a chance to review and has now given verbal consent and has requested care to be assessed and treated through telemedicine and understands there maybe limitations in this process and they may need further follow up care in the office and or hospital settings.   \par \par Verbal consent given on 04/15/2020 at 15:34 by PAIGE SETH, []\par \par Very pleasant 64-year-old gentleman who presents for evaluation of prostate cancer. Patient was diagnosed with prostate cancer 2 years ago. Pathology at that time demonstrated Paz 6 prostate cancer in one core. He was recommended to start active surveillance at that time which she elected to do. He reports no problems since then. He reports that his highest PSA was 6.71. He had an MRI recently [facility] and was told that the result was excellent. He does not know the PIRAD score of this MRI. He denies urinary complaints. No nocturia. No dysuria. No flank pain nausea pubic pain. No family history of prostate cancer. He does not smoke. [Urinary Retention] : no urinary retention [Urinary Urgency] : no urinary urgency [Urinary Frequency] : no urinary frequency [Nocturia] : no nocturia [Straining] : no straining [Weak Stream] : no weak stream [Dysuria] : no dysuria [Hematuria - Gross] : no gross hematuria [Bladder Spasm] : no bladder spasm [Abdominal Pain] : no abdominal pain [Flank Pain] : no flank pain [Fever] : no fever [Fatigue] : no fatigue [Nausea] : no nausea

## 2020-04-17 NOTE — ASSESSMENT
[FreeTextEntry1] : Very pleasant 64-year-old gentleman who presents for evaluation of prostate cancer\par -Obtain records from Dr. Alvarez and Dr. Gastelum regarding treatment thus far\par -Request records from [facility] for MRI\par -We discussed the normal course for active surveillance\par -We discussed the need to perform a repeat prostate biopsy, however given recent MRI performed approximately 4 months ago, we discussed doing this in approximately 6-10 months\par -We discussed the advantages of a transperineal biopsy as opposed to a transrectal biopsy\par -Given current concern for coated 19, we will schedule a followup visit in the office in 3 months with KEVEN\par \par -Addendum- I personally reviewed the patient's MRI from [facility] which demonstrated PIRAD 1 suspicion score\par

## 2020-07-12 ENCOUNTER — FORM ENCOUNTER (OUTPATIENT)
Age: 65
End: 2020-07-12

## 2020-07-15 ENCOUNTER — APPOINTMENT (OUTPATIENT)
Dept: UROLOGY | Facility: CLINIC | Age: 65
End: 2020-07-15
Payer: COMMERCIAL

## 2020-07-15 VITALS — HEART RATE: 70 BPM | TEMPERATURE: 97.6 F | SYSTOLIC BLOOD PRESSURE: 109 MMHG | DIASTOLIC BLOOD PRESSURE: 73 MMHG

## 2020-07-15 PROCEDURE — 99213 OFFICE O/P EST LOW 20 MIN: CPT

## 2020-07-15 NOTE — PHYSICAL EXAM
[General Appearance - Well Developed] : well developed [Well Groomed] : well groomed [Normal Appearance] : normal appearance [General Appearance - Well Nourished] : well nourished [Abdomen Soft] : soft [General Appearance - In No Acute Distress] : no acute distress [Urethral Meatus] : meatus normal [Costovertebral Angle Tenderness] : no ~M costovertebral angle tenderness [Abdomen Tenderness] : non-tender [Testes Mass (___cm)] : there were no testicular masses [Urinary Bladder Findings] : the bladder was normal on palpation [Scrotum] : the scrotum was normal [No Prostate Nodules] : no prostate nodules [Edema] : no peripheral edema [] : no respiratory distress [Exaggerated Use Of Accessory Muscles For Inspiration] : no accessory muscle use [Respiration, Rhythm And Depth] : normal respiratory rhythm and effort [Oriented To Time, Place, And Person] : oriented to person, place, and time [Mood] : the mood was normal [Affect] : the affect was normal [Not Anxious] : not anxious [Normal Station and Gait] : the gait and station were normal for the patient's age [No Focal Deficits] : no focal deficits [No Palpable Adenopathy] : no palpable adenopathy

## 2020-07-15 NOTE — HISTORY OF PRESENT ILLNESS
[FreeTextEntry1] : Very pleasant 64-year-old gentleman who presents for follow up of prostate cancer. Patient was diagnosed with Paz 6 prostate cancer in 1 core 2 years ago.. He was recommended to start active surveillance at that time which he elected to do. He reports no problems since then. He recently had an MRI at [facility] which demonstrated an overall suspicion score of PI-RADS 1.  He denies urinary complaints. No nocturia. No dysuria. No flank pain nausea pubic pain. No family history of prostate cancer. He does not smoke.  Recent PSA 7

## 2020-08-05 ENCOUNTER — APPOINTMENT (OUTPATIENT)
Dept: OTHER | Facility: CLINIC | Age: 65
End: 2020-08-05
Payer: COMMERCIAL

## 2020-08-05 PROCEDURE — 99441: CPT | Mod: 95

## 2020-08-05 NOTE — HISTORY OF PRESENT ILLNESS
[Home] : at home, [unfilled] , at the time of the visit. [Medical Office: ([facility])___] : at the medical office located in  [Verbal consent obtained from patient] : the patient, [unfilled] [FreeTextEntry1] : telephonic visit during the covid epidemic\par certified for sinusitis, della and prostate ca\par s. patient has been stable overall. ipratropium and oral antihistaimine are helping for his nasal symptoms. no new nasal bleeding.. he is sleeping better, does not report so much daytime somnolence. he did not tolerate cpap machnine.  \par on f/u for his prostate ca, is to underto prostate biopsy in october.\par continues working as a . pt has dm. \par wc: case approved.

## 2020-08-05 NOTE — ASSESSMENT
[FreeTextEntry1] : o. on telephone patient soudned in no difficulty breathing. technical difficulties during the call. \par a. overall stable. did not tolerate cpap but not symptomatic. under follow up for prostate ca.\par p. meds renewed, will check with long term presciption as pt not receiving meds via mail order. continue follow up for prostate ca. c4 completed, partially disabled. rtc in 4 mo. \par i spent 5 in on the phone with pt.

## 2020-08-11 ENCOUNTER — FORM ENCOUNTER (OUTPATIENT)
Age: 65
End: 2020-08-11

## 2020-10-13 ENCOUNTER — APPOINTMENT (OUTPATIENT)
Dept: UROLOGY | Facility: CLINIC | Age: 65
End: 2020-10-13

## 2020-11-23 NOTE — ED ADULT TRIAGE NOTE - TEMPERATURE IN CELSIUS (DEGREES C)
You can access the FollowMyHealth Patient Portal offered by [facility] by registering at the following website: [email or web address]. By joining "[employer]’s FollowMyHealth portal, you will also be able to view your health information using other applications (apps) compatible with our system. 36.7

## 2020-12-02 ENCOUNTER — APPOINTMENT (OUTPATIENT)
Dept: OTHER | Facility: CLINIC | Age: 65
End: 2020-12-02
Payer: COMMERCIAL

## 2020-12-02 PROCEDURE — 99396 PREV VISIT EST AGE 40-64: CPT | Mod: 95

## 2020-12-02 PROCEDURE — 99441: CPT | Mod: 95

## 2020-12-02 NOTE — DISCUSSION/SUMMARY
[Patient seen for WTC Monitoring ___] : Patient was seen for WTC monitoring [unfilled] [Please See Note in Chart and Documentation in Trial DB] : Please see note in chart and documentation in Trial DB. [FreeTextEntry3] : Symptoms and relevant review of symptoms: telephonic visit during the covid epidemic\par certified for sinusitis, della and prostate ca\par s. patient has been stable overall. ipratropium and oral antihistaimine are helping for his nasal symptoms, using them as needed. he did not tolerate cpap machnine, but reports no excessive daytime somnolence. \par on f/u for his prostate ca, could not attend prostate biopsy in october as his mother  at that time. \par continues working as a , currently working. pt has dm. \par wc: case approved. \par o. no difficulty breathing on telephone\par a and p. documentaiton completed.

## 2020-12-02 NOTE — PLAN
[FreeTextEntry1] : p. meds renewed. continue follow up for prostate ca. c4 completed, partially disabled. rtc in 4 mo. \par i spent 5 in on the phone with pt.  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

## 2020-12-02 NOTE — REASON FOR VISIT
[Follow-Up] : a [unfilled] follow-up visit  [FreeTextEntry2] : 9/13/2001 [FreeTextEntry1] : visit conductead in [place]

## 2020-12-02 NOTE — HISTORY OF PRESENT ILLNESS
[Home] : at home, [unfilled] , at the time of the visit. [Medical Office: ([facility])___] : at the medical office located in  [Verbal consent obtained from patient] : the patient, [unfilled] [FreeTextEntry1] : telephonic visit during the covid epidemic\par certified for sinusitis, della and prostate ca\par s. patient has been stable overall. ipratropium and oral antihistaimine are helping for his nasal symptoms, using them as needed. he did not tolerate cpap machnine, but reports no excessive daytime somnolence. \par on f/u for his prostate ca, could not attend prostate biopsy in october as his mother  at that time. \par continues working as a , currently working. pt has dm. \par wc: case approved.  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 [Yes] : The patient is currently working.

## 2020-12-02 NOTE — HISTORY OF PRESENT ILLNESS
[Home] : at home, [unfilled] , at the time of the visit. [Medical Office: ([facility])___] : at the medical office located in  [Verbal consent obtained from patient] : the patient, [unfilled] [FreeTextEntry1] : telephonic visit during the covid epidemic\par certified for sinusitis, della and prostate ca\par s. patient has been stable overall. ipratropium and oral antihistaimine are helping for his nasal symptoms, using them as needed. he did not tolerate cpap machnine, but reports no excessive daytime somnolence. \par on f/u for his prostate ca, could not attend prostate biopsy in october as his mother  at that time. \par continues working as a , currently working. pt has dm. \par wc: case approved.  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 [Yes] : The patient is currently working.

## 2020-12-02 NOTE — ASSESSMENT
[Indicate if, in your opinion, the incident that the patient described was the competent medical cause of this injury/illness.] : The incident that the patient described was the competent medical cause of this injury/illness: Yes [Indicate if the patient's complaints are consistent with his/her history of the injury/illness.] : Indicate if the patient's complaints are consistent with his/her history of the injury/illness: Yes [Yes] : Yes, it is consistent [Can the patient return to usual work activities as indicated? If yes, indicate date___] : The patient can return to usual work activities on [unfilled] [Are there any work limitations? (If so, explain and quantify, including the anticipated duration of the limitations)] : There are work limitations. [FreeTextEntry1] : o. on telephone patient sounded in no difficulty breathing.\par a. overall stable. did not tolerate cpap but not symptomatic. under follow up for prostate ca.\par  [FreeTextEntry5] : 61 [FreeTextEntry6] : clinical examination, tsting, biopsies [FreeTextEntry7] : avoid exposrue to triggers

## 2020-12-02 NOTE — REASON FOR VISIT
[Follow-Up] : a [unfilled] follow-up visit  [FreeTextEntry2] : 9/13/2001 [FreeTextEntry1] : visit conductead in [place]

## 2021-01-14 ENCOUNTER — NON-APPOINTMENT (OUTPATIENT)
Age: 66
End: 2021-01-14

## 2021-01-18 ENCOUNTER — FORM ENCOUNTER (OUTPATIENT)
Age: 66
End: 2021-01-18

## 2021-01-19 ENCOUNTER — APPOINTMENT (OUTPATIENT)
Dept: UROLOGY | Facility: CLINIC | Age: 66
End: 2021-01-19
Payer: COMMERCIAL

## 2021-01-19 PROCEDURE — 76942 ECHO GUIDE FOR BIOPSY: CPT | Mod: 59

## 2021-01-19 PROCEDURE — 55700: CPT

## 2021-01-19 PROCEDURE — 76872 US TRANSRECTAL: CPT

## 2021-01-24 ENCOUNTER — FORM ENCOUNTER (OUTPATIENT)
Age: 66
End: 2021-01-24

## 2021-01-25 ENCOUNTER — NON-APPOINTMENT (OUTPATIENT)
Age: 66
End: 2021-01-25

## 2021-01-26 ENCOUNTER — APPOINTMENT (OUTPATIENT)
Dept: UROLOGY | Facility: CLINIC | Age: 66
End: 2021-01-26
Payer: COMMERCIAL

## 2021-01-26 LAB — CORE LAB BIOPSY: NORMAL

## 2021-01-26 PROCEDURE — 99214 OFFICE O/P EST MOD 30 MIN: CPT

## 2021-01-26 NOTE — ASSESSMENT
[FreeTextEntry1] : Very pleasant 65-year-old gentleman who presents for follow-up of prostate cancer, elevated PSA\par -Prior PSA 7\par -Prostate biopsy reviewed with the patient demonstrating [place] group grade 1 prostate cancer, less than 5% of each core at the left posterior lateral apex in the left posterior medial base\par -We again discussed active surveillance protocol for Paz group grade 1 prostate cancer\par -Follow-up in 6 months with PSA

## 2021-01-26 NOTE — HISTORY OF PRESENT ILLNESS
[FreeTextEntry1] : Very pleasant 65-year-old gentleman who presents for follow up of prostate cancer. Patient was diagnosed with Paz 6 prostate cancer in 1 core 2 years ago. He was recommended to start active surveillance at that time which he elected to do. He reports no problems since then. He previously had an MRI at [facility] which demonstrated an overall suspicion score of PI-RADS 1.  Recent PSA 7.  He underwent a surveillance biopsy last week which demonstrated Paz group grade 1 prostate cancer in 2 cores.  He presents today to discuss the results of prostate biopsy.  He reports no problems after the biopsy, including hematuria, fevers, chills.

## 2021-01-26 NOTE — PHYSICAL EXAM

## 2021-07-21 ENCOUNTER — FORM ENCOUNTER (OUTPATIENT)
Age: 66
End: 2021-07-21

## 2021-07-27 ENCOUNTER — APPOINTMENT (OUTPATIENT)
Dept: UROLOGY | Facility: CLINIC | Age: 66
End: 2021-07-27
Payer: COMMERCIAL

## 2021-07-27 VITALS
HEART RATE: 84 BPM | TEMPERATURE: 97.6 F | RESPIRATION RATE: 15 BRPM | BODY MASS INDEX: 28.88 KG/M2 | SYSTOLIC BLOOD PRESSURE: 104 MMHG | DIASTOLIC BLOOD PRESSURE: 68 MMHG | WEIGHT: 184 LBS | HEIGHT: 67 IN

## 2021-07-27 PROCEDURE — 99213 OFFICE O/P EST LOW 20 MIN: CPT

## 2021-07-27 NOTE — DISEASE MANAGEMENT
[c] : c [X] : MX [0-10] : 0 -10 ng/mL [6] : Ernest Score 6 [] : Patient had a Prostate MRI [1] : 1 [BiopsyDate] : 1/2021 [TotalCores] : 12 [TotalPositiveCores] : 2 [MaxCoreInvolvement] : 5 [I] : I

## 2021-07-27 NOTE — HISTORY OF PRESENT ILLNESS
[FreeTextEntry1] : Very pleasant 65-year-old gentleman who presents for follow up of prostate cancer. Patient was diagnosed with Paz 6 prostate cancer in 1 core 2 years ago. He was recommended to start active surveillance at that time which he elected to do. He reports no problems since then. He previously had an MRI at [facility] which demonstrated an overall suspicion score of PI-RADS 1.  Recent PSA 7.  He underwent a surveillance biopsy 6 months ago which demonstrated [place] group grade 1 prostate cancer in 2 cores.  H he feels well.  He denies dysuria.  No hematuria.  No flank pain or suprapubic pain.  No other complaints.

## 2021-07-28 LAB — PSA SERPL-MCNC: 5 NG/ML

## 2022-01-26 ENCOUNTER — FORM ENCOUNTER (OUTPATIENT)
Age: 67
End: 2022-01-26

## 2022-01-28 ENCOUNTER — APPOINTMENT (OUTPATIENT)
Dept: UROLOGY | Facility: CLINIC | Age: 67
End: 2022-01-28

## 2022-02-16 NOTE — HISTORY OF PRESENT ILLNESS
[FreeTextEntry1] : certified for sinusitis, della and prostate ca\par s. patient has been stable overall. reports occasional nasal bleeding. he got a freezing treatment for his sinuses and this resulted in much improvement. he is sleeping better, does not report so much daytime somnolence. \par on f/u for his prostate ca with pmd, is to get a mri and then will have a repeated biospy as per pt's reprot. \par continues working as a . \par wc: case approved.
[FreeTextEntry1] : certified for sinusitis, della and prostate ca\par s. patient has been stable overall. reports occasional nasal bleeding. he got a freezing treatment for his sinuses and this resulted in much improvement. he is sleeping better, does not report so much daytime somnolence. \par on f/u for his prostate ca with pmd, is to get a mri and then will have a repeated biospy as per pt's reprot. \par continues working as a . \par wc: case approved.
Statement Selected

## 2022-04-20 ENCOUNTER — APPOINTMENT (OUTPATIENT)
Dept: UROLOGY | Facility: CLINIC | Age: 67
End: 2022-04-20
Payer: COMMERCIAL

## 2022-04-20 PROCEDURE — 99213 OFFICE O/P EST LOW 20 MIN: CPT

## 2022-04-20 NOTE — HISTORY OF PRESENT ILLNESS
[FreeTextEntry1] : Very pleasant 66-year-old gentleman who presents for follow up of prostate cancer. Patient was diagnosed with Paz 6 prostate cancer in 1 core, 2 years ago. He was recommended to start active surveillance at that time which he elected to do. He reports no problems since then. He previously had an MRI at [facility] which demonstrated an overall suspicion score of PI-RADS 1 in 2019.  PSA from 7/2021 was 5.00.  PSA in the past has been as high as 7.  He underwent a surveillance biopsy in January 2021 which demonstrated [place] group grade 1 prostate cancer in 2 cores.  H he feels well.  He denies dysuria.  No hematuria.  No flank pain or suprapubic pain.  No other complaints.  He has not had a recent MRI.

## 2022-04-20 NOTE — DISEASE MANAGEMENT
[c] : c [0-10] : 0 -10 ng/mL [Biopsy] : Patient had a biopsy on [6] : Template Biopsy Paz Score: 6 [] : Patient had a Prostate MRI [1] : 1 [BiopsyDate] : 1/21 [TotalCores] : 12 [TotalPositiveCores] : 2 [MaxCoreInvolvement] : 5 [I] : I

## 2022-04-21 LAB — PSA SERPL-MCNC: 5.24 NG/ML

## 2022-05-01 ENCOUNTER — NON-APPOINTMENT (OUTPATIENT)
Age: 67
End: 2022-05-01

## 2022-05-04 ENCOUNTER — FORM ENCOUNTER (OUTPATIENT)
Age: 67
End: 2022-05-04

## 2022-05-06 ENCOUNTER — RESULT REVIEW (OUTPATIENT)
Age: 67
End: 2022-05-06

## 2022-05-06 ENCOUNTER — OUTPATIENT (OUTPATIENT)
Dept: OUTPATIENT SERVICES | Facility: HOSPITAL | Age: 67
LOS: 1 days | End: 2022-05-06
Payer: COMMERCIAL

## 2022-05-06 ENCOUNTER — APPOINTMENT (OUTPATIENT)
Dept: MRI IMAGING | Facility: IMAGING CENTER | Age: 67
End: 2022-05-06
Payer: COMMERCIAL

## 2022-05-06 DIAGNOSIS — C61 MALIGNANT NEOPLASM OF PROSTATE: ICD-10-CM

## 2022-05-06 DIAGNOSIS — Z98.890 OTHER SPECIFIED POSTPROCEDURAL STATES: Chronic | ICD-10-CM

## 2022-05-06 PROCEDURE — 76498 UNLISTED MR PROCEDURE: CPT

## 2022-05-06 PROCEDURE — 72197 MRI PELVIS W/O & W/DYE: CPT

## 2022-05-06 PROCEDURE — 76498P: CUSTOM | Mod: 26

## 2022-05-06 PROCEDURE — 72197 MRI PELVIS W/O & W/DYE: CPT | Mod: 26

## 2022-05-06 PROCEDURE — A9585: CPT

## 2022-05-25 ENCOUNTER — APPOINTMENT (OUTPATIENT)
Dept: UROLOGY | Facility: CLINIC | Age: 67
End: 2022-05-25
Payer: COMMERCIAL

## 2022-06-03 ENCOUNTER — APPOINTMENT (OUTPATIENT)
Dept: UROLOGY | Facility: CLINIC | Age: 67
End: 2022-06-03
Payer: COMMERCIAL

## 2022-06-03 VITALS
SYSTOLIC BLOOD PRESSURE: 104 MMHG | OXYGEN SATURATION: 96 % | DIASTOLIC BLOOD PRESSURE: 69 MMHG | BODY MASS INDEX: 30.66 KG/M2 | HEART RATE: 83 BPM | WEIGHT: 184 LBS | TEMPERATURE: 98 F | HEIGHT: 65 IN

## 2022-06-03 PROCEDURE — 99213 OFFICE O/P EST LOW 20 MIN: CPT

## 2022-06-03 NOTE — HISTORY OF PRESENT ILLNESS
[FreeTextEntry1] : Very pleasant 66-year-old gentleman who presents for follow up of prostate cancer. Patient was diagnosed with Paz 6 prostate cancer in 1 core, 2 years ago. He was recommended to start active surveillance at that time which he elected to do. He reports no problems since then. He previously had an MRI at [facility] which demonstrated an overall suspicion score of PI-RADS 1 in 2019.  PSA from 7/2021 was 5.00.  PSA in the past has been as high as 7.  More recently PSA was noted to be 5.24.  He underwent a surveillance biopsy in January 2021 which demonstrated [place] group grade 1 prostate cancer in 2 cores.  H he feels well.  He denies dysuria.  No hematuria.  No flank pain or suprapubic pain.  No other complaints.  \par \par Recent MRI demonstrates an overall suspicion score of PI-RADS 2.

## 2022-06-03 NOTE — DISEASE MANAGEMENT
[0-10] : 0 -10 ng/mL [Biopsy] : Patient had a biopsy on [6] : Template Biopsy Paz Score: 6 [] : Patient had a Prostate MRI [2] : 2 [IC] : IC [BiopsyDate] : 1/21

## 2022-06-22 ENCOUNTER — APPOINTMENT (OUTPATIENT)
Dept: OTHER | Facility: CLINIC | Age: 67
End: 2022-06-22
Payer: COMMERCIAL

## 2022-06-22 VITALS
HEART RATE: 70 BPM | WEIGHT: 183 LBS | SYSTOLIC BLOOD PRESSURE: 88 MMHG | DIASTOLIC BLOOD PRESSURE: 59 MMHG | TEMPERATURE: 98.1 F | OXYGEN SATURATION: 96 % | HEIGHT: 65 IN | BODY MASS INDEX: 30.49 KG/M2

## 2022-06-22 PROCEDURE — 99214 OFFICE O/P EST MOD 30 MIN: CPT | Mod: 25

## 2022-06-22 PROCEDURE — 99397 PER PM REEVAL EST PAT 65+ YR: CPT | Mod: 25

## 2022-06-22 RX ORDER — IPRATROPIUM BROMIDE 21 UG/1
0.03 SPRAY NASAL 3 TIMES DAILY
Qty: 3 | Refills: 1 | Status: COMPLETED | COMMUNITY
Start: 2019-10-02 | End: 2022-06-22

## 2022-06-22 NOTE — HISTORY OF PRESENT ILLNESS
[FreeTextEntry1] : certified for sinusitis, della and prostate ca\par s. patient reports worsening symtpoms with nasal bleeding several times a day, especially lately. continues with allergy reactions especially in his nose. \par he did not tolerate cpap machnine. he reports excessive daytime somnolence, describing that he "needs to be on coffee all the time" in order not to fall asleep when driving. . \par on f/u for his prostate ca. \par continues working as a , currently working. pt has dm. \par wc: case approved.  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 [Yes] : The patient is currently working.

## 2022-06-22 NOTE — PLAN
[FreeTextEntry1] : p. will renew allergy oral meds but stop nasal spray. urgent referral to ent. continue follow up for prostate ca. recommend to stop working. c4 completed, partially disabled. rtc in 4 mo. 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

## 2022-06-22 NOTE — REASON FOR VISIT
[Follow-Up] : a [unfilled] follow-up visit  [FreeTextEntry2] : 9/13/2001 [FreeTextEntry1] : visit conducted in [place]

## 2022-06-22 NOTE — HISTORY OF PRESENT ILLNESS
[FreeTextEntry1] : certified for sinusitis, della and prostate ca\par s. patient reports worsening symtpoms with nasal bleeding several times a day, especially lately. continues with allergy reactions especially in his nose. \par he did not tolerate cpap machnine. he reports excessive daytime somnolence, describing that he "needs to be on coffee all the time" in order not to fall asleep when driving. . \par on f/u for his prostate ca. \par continues working as a , currently working. pt has dm. \par wc: case approved.  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 [Yes] : The patient is currently working.

## 2022-06-22 NOTE — HEALTH RISK ASSESSMENT
[Patient reported colonoscopy was abnormal] : Patient reported colonoscopy was abnormal [ColonoscopyDate] : 6/2021 [ColonoscopyComments] : polyps

## 2022-06-22 NOTE — ASSESSMENT
[Indicate if, in your opinion, the incident that the patient described was the competent medical cause of this injury/illness.] : The incident that the patient described was the competent medical cause of this injury/illness: Yes [Indicate if the patient's complaints are consistent with his/her history of the injury/illness.] : Indicate if the patient's complaints are consistent with his/her history of the injury/illness: Yes [Yes] : Yes, it is consistent [Can the patient return to usual work activities as indicated? If yes, indicate date___] : The patient can return to usual work activities on [unfilled] [Are there any work limitations? (If so, explain and quantify, including the anticipated duration of the limitations)] : There are work limitations. [FreeTextEntry1] : a. epistaxis. worsening della symptoms. under follow up for prostate ca. upon review of patient's current cmplaints i have recommended him to stop working as soon as possible due to della (he works as a ) and his other medical condtions. patient to explore stopping working. \par  [FreeTextEntry5] : 61 [FreeTextEntry6] : clinical examination, tsting, biopsies [FreeTextEntry7] : avoid exposure to triggers, avoid driving.

## 2022-06-22 NOTE — REASON FOR VISIT
[Follow-Up] : a [unfilled] follow-up visit  [FreeTextEntry2] : 9/13/2001 [FreeTextEntry1] : visit conducted in [place]

## 2022-06-22 NOTE — DISCUSSION/SUMMARY
[Patient seen for WTC Monitoring ___] : Patient was seen for WTC monitoring [unfilled] [Please See Note in Chart and Documentation in Trial DB] : Please see note in chart and documentation in Trial DB. [FreeTextEntry3] : Symptoms and relevant review of symptoms: certified for sinusitis, della and prostate ca\par s. patient reports worsening symtpoms with nasal bleeding several times a day, especially lately. continues with allergy reactions especially in his nose. \par he did not tolerate cpap machnine. he reports excessive daytime somnolence, describing that he "needs to be on coffee all the time" in order not to fall asleep when driving.. \par on f/u for his prostate ca. \par continues working as a , has dm\par Physical Exam\par Constitutional: alert, in no acute distress, well nourished and well developed. \par Eyes: the sclera and conjunctiva were normal, pupils were equal in size, round, and reactive to light and extraocular movements were intact. \par ENT: the ears and nose were normal in appearance. No tender at sinuses. there is redness noted upon inspectin of left nostril. \par Neck: the appearance of the neck was normal, the neck was supple, no neck mass was observed and the thyroid was not enlarged . there was no jugular-venous distention. \par Pulmonary: no respiratory distress, normal respiratory rhythm and effort, no accessory muscle use, lungs were clear to auscultation bilaterally and palpation of the chest revealed no abnormalities. \par Heart: the apical impulse was normal, heart rate was normal and rhythm regular, normal S1 and S2, no gallops and no murmurs. \par Vascular:. there was no peripheral edema. \par Abdomen: normal bowel sounds, soft, non-tender, no hepato-splenomegaly and no abdominal mass palpated . umbilical hernia non tender. \par Lymphatics: The posterior cervical, anterior cervical, supraclavicular and axillary nodes were non-tender and normal size. \par a and p. documentation completed. colonoscoy in some 4 ys.

## 2022-06-22 NOTE — PHYSICAL EXAM
[General Appearance - Alert] : alert [General Appearance - In No Acute Distress] : in no acute distress [General Appearance - Well Nourished] : well nourished [General Appearance - Well Developed] : well developed [Sclera] : the sclera and conjunctiva were normal [PERRL With Normal Accommodation] : pupils were equal in size, round, and reactive to light [Extraocular Movements] : extraocular movements were intact [Outer Ear] : the ears and nose were normal in appearance [Neck Appearance] : the appearance of the neck was normal [Neck Cervical Mass (___cm)] : no neck mass was observed [Jugular Venous Distention Increased] : there was no jugular-venous distention [Thyroid Diffuse Enlargement] : the thyroid was not enlarged [Respiration, Rhythm And Depth] : normal respiratory rhythm and effort [Exaggerated Use Of Accessory Muscles For Inspiration] : no accessory muscle use [Auscultation Breath Sounds / Voice Sounds] : lungs were clear to auscultation bilaterally [Chest Palpation] : palpation of the chest revealed no abnormalities [Apical Impulse] : the apical impulse was normal [Heart Rate And Rhythm] : heart rate was normal and rhythm regular [Heart Sounds] : normal S1 and S2 [Heart Sounds Gallop] : no gallops [Murmurs] : no murmurs [Edema] : there was no peripheral edema [Bowel Sounds] : normal bowel sounds [Abdomen Soft] : soft [Abdomen Tenderness] : non-tender [] : no hepato-splenomegaly [Abdomen Mass (___ Cm)] : no abdominal mass palpated [Cervical Lymph Nodes Enlarged Posterior Bilaterally] : posterior cervical [Cervical Lymph Nodes Enlarged Anterior Bilaterally] : anterior cervical [Supraclavicular Lymph Nodes Enlarged Bilaterally] : supraclavicular [Axillary Lymph Nodes Enlarged Bilaterally] : axillary [No Spinal Tenderness] : no spinal tenderness [FreeTextEntry1] : tender at paraspinal.

## 2022-06-23 LAB
ALBUMIN SERPL ELPH-MCNC: 4.9 G/DL
ALP BLD-CCNC: 66 U/L
ALT SERPL-CCNC: 23 U/L
ANION GAP SERPL CALC-SCNC: 14 MMOL/L
APPEARANCE: CLEAR
AST SERPL-CCNC: 17 U/L
BASOPHILS # BLD AUTO: 0.02 K/UL
BASOPHILS NFR BLD AUTO: 0.3 %
BILIRUB SERPL-MCNC: 0.6 MG/DL
BILIRUBIN URINE: NEGATIVE
BLOOD URINE: NEGATIVE
BUN SERPL-MCNC: 14 MG/DL
CALCIUM SERPL-MCNC: 9.5 MG/DL
CHLORIDE SERPL-SCNC: 101 MMOL/L
CHOLEST SERPL-MCNC: 133 MG/DL
CO2 SERPL-SCNC: 24 MMOL/L
COLOR: NORMAL
CREAT SERPL-MCNC: 0.67 MG/DL
EGFR: 104 ML/MIN/1.73M2
EOSINOPHIL # BLD AUTO: 0.14 K/UL
EOSINOPHIL NFR BLD AUTO: 2.4 %
GLUCOSE QUALITATIVE U: ABNORMAL
GLUCOSE SERPL-MCNC: 129 MG/DL
HCT VFR BLD CALC: 48.2 %
HDLC SERPL-MCNC: 44 MG/DL
HGB BLD-MCNC: 15.8 G/DL
IMM GRANULOCYTES NFR BLD AUTO: 0.3 %
KETONES URINE: NEGATIVE
LDLC SERPL CALC-MCNC: 73 MG/DL
LEUKOCYTE ESTERASE URINE: NEGATIVE
LYMPHOCYTES # BLD AUTO: 1.82 K/UL
LYMPHOCYTES NFR BLD AUTO: 31.1 %
MAN DIFF?: NORMAL
MCHC RBC-ENTMCNC: 29.7 PG
MCHC RBC-ENTMCNC: 32.8 GM/DL
MCV RBC AUTO: 90.6 FL
MONOCYTES # BLD AUTO: 0.39 K/UL
MONOCYTES NFR BLD AUTO: 6.7 %
NEUTROPHILS # BLD AUTO: 3.47 K/UL
NEUTROPHILS NFR BLD AUTO: 59.2 %
NITRITE URINE: NEGATIVE
NONHDLC SERPL-MCNC: 90 MG/DL
PH URINE: 6.5
PLATELET # BLD AUTO: 273 K/UL
POTASSIUM SERPL-SCNC: 4 MMOL/L
PROT SERPL-MCNC: 7.5 G/DL
PROTEIN URINE: NEGATIVE
RBC # BLD: 5.32 M/UL
RBC # FLD: 12.9 %
SODIUM SERPL-SCNC: 138 MMOL/L
SPECIFIC GRAVITY URINE: 1.03
TRIGL SERPL-MCNC: 84 MG/DL
UROBILINOGEN URINE: NORMAL
WBC # FLD AUTO: 5.86 K/UL

## 2022-07-13 ENCOUNTER — NON-APPOINTMENT (OUTPATIENT)
Age: 67
End: 2022-07-13

## 2022-07-24 ENCOUNTER — EMERGENCY (EMERGENCY)
Facility: HOSPITAL | Age: 67
LOS: 1 days | Discharge: HOME | End: 2022-07-24
Attending: EMERGENCY MEDICINE | Admitting: EMERGENCY MEDICINE

## 2022-07-24 VITALS
SYSTOLIC BLOOD PRESSURE: 126 MMHG | HEART RATE: 100 BPM | TEMPERATURE: 99 F | RESPIRATION RATE: 14 BRPM | OXYGEN SATURATION: 95 % | DIASTOLIC BLOOD PRESSURE: 66 MMHG

## 2022-07-24 DIAGNOSIS — Z98.890 OTHER SPECIFIED POSTPROCEDURAL STATES: Chronic | ICD-10-CM

## 2022-07-24 PROCEDURE — 72125 CT NECK SPINE W/O DYE: CPT | Mod: 26,MD

## 2022-07-24 PROCEDURE — 70450 CT HEAD/BRAIN W/O DYE: CPT | Mod: 26,MD

## 2022-07-24 PROCEDURE — 99285 EMERGENCY DEPT VISIT HI MDM: CPT

## 2022-07-24 RX ORDER — ACETAMINOPHEN 500 MG
650 TABLET ORAL ONCE
Refills: 0 | Status: COMPLETED | OUTPATIENT
Start: 2022-07-24 | End: 2022-07-24

## 2022-07-24 RX ORDER — IBUPROFEN 200 MG
600 TABLET ORAL ONCE
Refills: 0 | Status: COMPLETED | OUTPATIENT
Start: 2022-07-24 | End: 2022-07-24

## 2022-07-24 RX ADMIN — Medication 600 MILLIGRAM(S): at 19:53

## 2022-07-24 RX ADMIN — Medication 650 MILLIGRAM(S): at 19:53
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
## 2022-07-24 NOTE — ED ADULT NURSE NOTE - CHPI ED NUR SYMPTOMS POS
Reason for Disposition  • [1] MILD vomiting (1-2 times/day) AND [2] age > 1 year old AND [3] present < 3 days    Protocols used: VOMITING WITHOUT DIARRHEA-P-AH     PAIN/STIFFNESS/TENDERNESS

## 2022-07-24 NOTE — ED PROVIDER NOTE - PATIENT PORTAL LINK FT
You can access the FollowMyHealth Patient Portal offered by [facility] by registering at the following website: [email or web address]. By joining [facility]’s FollowMyHealth portal, you will also be able to view your health information using other applications (apps) compatible with our system.

## 2022-07-24 NOTE — ED PROVIDER NOTE - NSFOLLOWUPINSTRUCTIONS_ED_ALL_ED_FT
Tylenol 500 mg (1 or 2 every 6 hours) and/or naproxen 500 mg (1 every 12 hours) for pain.     Use cold compresses (such as ice in a plastic bag) over affected areas for 15 minutes 3 times a day x 3 days. Then, use warm compresses (such as warm rice in a plastic bag) for 15 minutes 3 times a day.

## 2022-07-24 NOTE — ED ADULT NURSE NOTE - SKIN TEMPERATURE MOISTURE
[FreeTextEntry1] : hld, vit D, pos EFREN, arm pain [de-identified] : Pt is a 56 y/o female with a hx HLD on statin, found to have low vit D on labs, pos EFREN \par Arm has been better \par Not taking the vit D\par Taking statin\par feels well. No c/o. Stress level has been better. \par taking meds w/o probs. healthy diet\par Exercise - has been doing. no longer running\par no cv sx. \par no neuro sx\par no GI sx. \par no musculoskeletal sx\par no rash\par \par pap - 5/19\par mammo - 7/20\par colon - Dr Stokes - 7/31/18 - polyp, f/u 5-10\par vaccines - with employee health - had flu vaccine\par quant neg\par \par ophtho - went during the summer - did not get dilated this year.\par dental - up to date  warm/dry
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
## 2022-07-24 NOTE — ED ADULT NURSE REASSESSMENT NOTE - NS ED NURSE REASSESS COMMENT FT1
report was received for  from AM nurse. pt was received In the  ER hallway bed  3A. pt is aaox4, breathing  freely  and spontaneously on  room air VS stable.pt denies any medical complaints at this time .  will continue care and monitor.
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
## 2022-07-24 NOTE — ED PROVIDER NOTE - PHYSICAL EXAMINATION
Well appearing, well nourished, awake, alert, oriented to person, place, time/situation and in no apparent distress.    Airway patent. TTP L mastoid area. Neck FROM.     Eyes without scleral injection. No jaundice.    Strong pulse.    Respirations unlabored.    Abdomen soft, non-tender, no guarding.    Spine appears normal, range of motion is not limited, no muscle or joint tenderness.    Alert and oriented, no gross motor or sensory deficits.    Skin normal color for race, warm, dry and intact. No evidence of rash.    No SI/HI. Well appearing, well nourished, awake, alert, oriented to person, place, time/situation and in no apparent distress.    Airway patent. TTP L mastoid and SCM area. Neck FROM.     Eyes without scleral injection. No jaundice.    Strong pulse.    Respirations unlabored.    Abdomen soft, non-tender, no guarding.    Spine appears normal, range of motion is not limited, no muscle or joint tenderness.    Alert and oriented, no gross motor or sensory deficits.    Skin normal color for race, warm, dry and intact. No evidence of rash or lac/abrasion.    No SI/HI.
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
## 2022-08-02 ENCOUNTER — NON-APPOINTMENT (OUTPATIENT)
Age: 67
End: 2022-08-02

## 2022-08-09 ENCOUNTER — NON-APPOINTMENT (OUTPATIENT)
Age: 67
End: 2022-08-09

## 2022-08-10 ENCOUNTER — NON-APPOINTMENT (OUTPATIENT)
Age: 67
End: 2022-08-10

## 2022-08-24 ENCOUNTER — APPOINTMENT (OUTPATIENT)
Dept: OTHER | Facility: CLINIC | Age: 67
End: 2022-08-24

## 2022-08-24 VITALS
DIASTOLIC BLOOD PRESSURE: 76 MMHG | TEMPERATURE: 98 F | OXYGEN SATURATION: 97 % | HEART RATE: 77 BPM | BODY MASS INDEX: 30.82 KG/M2 | SYSTOLIC BLOOD PRESSURE: 108 MMHG | WEIGHT: 185 LBS | HEIGHT: 65 IN | RESPIRATION RATE: 18 BRPM

## 2022-08-24 PROCEDURE — 99214 OFFICE O/P EST MOD 30 MIN: CPT

## 2022-08-24 RX ORDER — ENEMA 19; 7 G/133ML; G/133ML
7-19 ENEMA RECTAL
Qty: 1 | Refills: 0 | Status: COMPLETED | COMMUNITY
Start: 2020-07-15 | End: 2022-08-24

## 2022-08-24 NOTE — ASSESSMENT
[Indicate if, in your opinion, the incident that the patient described was the competent medical cause of this injury/illness.] : The incident that the patient described was the competent medical cause of this injury/illness: Yes [Indicate if the patient's complaints are consistent with his/her history of the injury/illness.] : Indicate if the patient's complaints are consistent with his/her history of the injury/illness: Yes [Yes] : Yes, it is consistent [Can the patient return to usual work activities as indicated? If yes, indicate date___] : The patient can return to usual work activities on [unfilled] [Are there any work limitations? (If so, explain and quantify, including the anticipated duration of the limitations)] : There are work limitations. [FreeTextEntry1] : a. persistent epistaxis. under follow up for prostate ca. della. patient's conditions are active, permanent, persistent and chronic. he will need medical treatment and meds most probably for the rest of his life. he has stopped working as per my recommendation, especially due to his wtc conditions. \par  [FreeTextEntry5] : 100 [FreeTextEntry6] : clinical examination, cxr, pft, biopsies [FreeTextEntry7] : avoid exposure to triggers, avoid driving.

## 2022-08-24 NOTE — PHYSICAL EXAM
[General Appearance - Alert] : alert [General Appearance - In No Acute Distress] : in no acute distress [General Appearance - Well Nourished] : well nourished [General Appearance - Well Developed] : well developed [Sclera] : the sclera and conjunctiva were normal [PERRL With Normal Accommodation] : pupils were equal in size, round, and reactive to light [Extraocular Movements] : extraocular movements were intact [Outer Ear] : the ears and nose were normal in appearance [FreeTextEntry1] : no tender at sinuses. there is no redness of the oropharynx but there are abundant mucous secretions.   [Neck Appearance] : the appearance of the neck was normal [Neck Cervical Mass (___cm)] : no neck mass was observed [Jugular Venous Distention Increased] : there was no jugular-venous distention [Thyroid Diffuse Enlargement] : the thyroid was not enlarged [] : no respiratory distress [Respiration, Rhythm And Depth] : normal respiratory rhythm and effort [Exaggerated Use Of Accessory Muscles For Inspiration] : no accessory muscle use [Auscultation Breath Sounds / Voice Sounds] : lungs were clear to auscultation bilaterally [Chest Palpation] : palpation of the chest revealed no abnormalities [Apical Impulse] : the apical impulse was normal [Heart Rate And Rhythm] : heart rate was normal and rhythm regular [Heart Sounds] : normal S1 and S2 [Heart Sounds Gallop] : no gallops [Murmurs] : no murmurs [Edema] : there was no peripheral edema [Cervical Lymph Nodes Enlarged Posterior Bilaterally] : posterior cervical [Cervical Lymph Nodes Enlarged Anterior Bilaterally] : anterior cervical [Supraclavicular Lymph Nodes Enlarged Bilaterally] : supraclavicular [Axillary Lymph Nodes Enlarged Bilaterally] : axillary

## 2022-08-24 NOTE — PLAN
[FreeTextEntry1] : p. will refer to ent for ongoing treatment and refer to allergy as recommended by ent. continue follow up for prostate ca. patient has stopped working as per my recommendation. he is totally disabled. rtc in 4 mo. 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

## 2022-08-24 NOTE — HISTORY OF PRESENT ILLNESS
[FreeTextEntry1] : certified for sinusitis, della and prostate ca\par s. patient continues with occasional nasal bleeding. he was seen by ent and is under follow up. \par he did not tolerate cpap machnine.  \par on f/u for his prostate ca. \par patient has stopped working as per my recommendation. \par pt has dm. \par wc: case approved.  [FreeTextEntry2] :  [FreeTextEntry3] : exposure to dusts and fumes, extremes of tempearture and humidity [FreeTextEntry4] : nasal spray, anthihistamines [FreeTextEntry5] : none [Has the patient missed work because of the injury/illness?] : The patient has missed work because of the injury/illness. [No] : The patient is currently not working. [FreeTextEntry6] : july 2022 Stable

## 2022-08-24 NOTE — REASON FOR VISIT
[Follow-Up] : a [unfilled] follow-up visit  [FreeTextEntry2] : 9/13/2001 [FreeTextEntry1] : visit conducted in Lithuanian

## 2022-11-19 ENCOUNTER — EMERGENCY (EMERGENCY)
Facility: HOSPITAL | Age: 67
LOS: 1 days | Discharge: HOME | End: 2022-11-19
Admitting: EMERGENCY MEDICINE

## 2022-11-19 VITALS
TEMPERATURE: 98 F | HEART RATE: 80 BPM | SYSTOLIC BLOOD PRESSURE: 118 MMHG | RESPIRATION RATE: 14 BRPM | OXYGEN SATURATION: 100 % | DIASTOLIC BLOOD PRESSURE: 73 MMHG

## 2022-11-19 DIAGNOSIS — Z98.890 OTHER SPECIFIED POSTPROCEDURAL STATES: Chronic | ICD-10-CM

## 2022-11-19 PROCEDURE — 99284 EMERGENCY DEPT VISIT MOD MDM: CPT

## 2022-11-19 RX ORDER — TETANUS TOXOID, REDUCED DIPHTHERIA TOXOID AND ACELLULAR PERTUSSIS VACCINE, ADSORBED 5; 2.5; 8; 8; 2.5 [IU]/.5ML; [IU]/.5ML; UG/.5ML; UG/.5ML; UG/.5ML
0.5 SUSPENSION INTRAMUSCULAR ONCE
Refills: 0 | Status: COMPLETED | OUTPATIENT
Start: 2022-11-19 | End: 2022-11-19

## 2022-11-19 RX ADMIN — TETANUS TOXOID, REDUCED DIPHTHERIA TOXOID AND ACELLULAR PERTUSSIS VACCINE, ADSORBED 0.5 MILLILITER(S): 5; 2.5; 8; 8; 2.5 SUSPENSION INTRAMUSCULAR at 16:37

## 2022-11-19 RX ADMIN — Medication 1 TABLET(S): at 16:37

## 2022-11-19 NOTE — ED PROVIDER NOTE - NSFOLLOWUPINSTRUCTIONS_ED_ALL_ED_FT
Follow up with your PMD within 2 days for wound recheck.  Take Augmentin as prescribed.     Any increased pain, redness, fever, chills or any new concerning symptoms return to the emergency department.

## 2022-11-19 NOTE — ED PROVIDER NOTE - NSCAREINITIATED _GEN_ER
Cristiane Mckinnon(PA) Quality 110: Preventive Care And Screening: Influenza Immunization: Influenza Immunization Administered during Influenza season Quality 400a: One-Time Screening For Hepatitis C Virus (Hcv) For All Patients: One-time screening for HCV infection not received within 12 month period and no documentation of prior screening, reasont not given. Detail Level: Detailed

## 2022-11-19 NOTE — ED PROVIDER NOTE - PATIENT PORTAL LINK FT
You can access the FollowMyHealth Patient Portal offered by [facility] by registering at the following website: [email or web address]. By joining [facility]’s FollowMyHealth portal, you will also be able to view your health information using other applications (apps) compatible with our system.

## 2022-11-19 NOTE — ED ADULT TRIAGE NOTE - CHIEF COMPLAINT QUOTE
pt bitten by neighbor's dog today in right wrist, noted bite marks with controlled bleeding, new dressing applied. PMh: HLD, DM2 pc=600

## 2022-11-19 NOTE — ED PROVIDER NOTE - SKIN WOUND TYPE
four abrasions/puncture wounds, no active bleeding, no redness, no swelling/ABRASION(S)/PUNCTURE WOUND(S)

## 2022-11-19 NOTE — ED ADULT NURSE NOTE - OBJECTIVE STATEMENT
Received pt to PAT [facility], ambulatory. C/O dog bite to right wrist, scabs noted to the right wrist, ROM intact. Medicated as per MD, will continue to monitor.

## 2022-11-19 NOTE — ED PROVIDER NOTE - OBJECTIVE STATEMENT
66-year-old male with past medical history of diabetes not on insulin hyperlipidemia presents to ED status post dog bite to right wrist today.  Patient states he cleaned wound with soap and water.  Patient states bleeding has stopped.  Patient bit by neighbors dog who is fully vaccinated and normally bites.  Patient denies any pain fevers swelling redness or discharge. Unknown last tetanus shot.
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
## 2022-11-19 NOTE — ED ADULT NURSE NOTE - CHIEF COMPLAINT QUOTE
pt bitten by neighbor's dog today in right wrist, noted bite marks with controlled bleeding, new dressing applied. PMh: HLD, DM2 dv=[id number]

## 2022-11-22 ENCOUNTER — APPOINTMENT (OUTPATIENT)
Dept: UROLOGY | Facility: CLINIC | Age: 67
End: 2022-11-22

## 2022-11-22 VITALS
HEIGHT: 65 IN | OXYGEN SATURATION: 99 % | SYSTOLIC BLOOD PRESSURE: 128 MMHG | TEMPERATURE: 97.5 F | DIASTOLIC BLOOD PRESSURE: 88 MMHG | WEIGHT: 188 LBS | BODY MASS INDEX: 31.32 KG/M2 | HEART RATE: 80 BPM

## 2022-11-22 PROCEDURE — 99214 OFFICE O/P EST MOD 30 MIN: CPT

## 2022-11-22 NOTE — HISTORY OF PRESENT ILLNESS
[FreeTextEntry1] : Very pleasant 66-year-old gentleman who presents for follow-up of prostate cancer on active surveillance and erectile dysfunction.  He reports that he has tried Cialis and Viagra in the past, however these have not significantly improved his erections.  He was previously found to have [place] group grade 1 prostate cancer in 2 cores on most recent surveillance biopsy.  He denies dysuria.  No hematuria.  He is interested in trying additional options for management of erectile dysfunction.

## 2022-11-22 NOTE — ASSESSMENT
[FreeTextEntry1] : Very pleasant 66-year-old gentleman who presents for follow-up of prostate cancer, erectile dysfunction\par -We had an extensive discussion regarding possible management options for erectile dysfunction, including PDE 5 inhibitors, ANANYA, ICI, intraurethral alprostadil, penile prosthesis\par -After a thorough discussion of the risks and benefits of the aforementioned options he would like to try a trial of Trimix\par -PSA today\par -Extensive discussion of the risk of Trimix\par -Reviewed that if he has a sustained erection longer than 4 hours he must go to an emergency department immediately\par -Reviewed that he must alternate sides of injection\par -Reviewed that Trimix should not be used more than once per day and 3 times per week\par -Patient verbalized understanding of all of the above instructions\par -F/U in 1 week for first injection teaching\par

## 2022-11-23 LAB — PSA SERPL-MCNC: 4.35 NG/ML

## 2022-11-29 ENCOUNTER — APPOINTMENT (OUTPATIENT)
Dept: UROLOGY | Facility: CLINIC | Age: 67
End: 2022-11-29

## 2022-11-29 PROCEDURE — 99214 OFFICE O/P EST MOD 30 MIN: CPT

## 2022-11-29 NOTE — HISTORY OF PRESENT ILLNESS
[FreeTextEntry1] : Very pleasant 66-year-old gentleman who presents for follow-up of prostate cancer on active surveillance and erectile dysfunction.  He reports that he has tried Cialis and Viagra in the past, however these have not significantly improved his erections.  He was previously found to have [place] group grade 1 prostate cancer in 2 cores on most recent surveillance biopsy.  He denies dysuria.  No hematuria.  He was prescribed Trimix and presents today for first injection teaching

## 2022-11-29 NOTE — ASSESSMENT
[FreeTextEntry1] : Very pleasant 66-year-old gentleman who presents for follow-up of erectile dysfunction, prostate cancer on active surveillance\par -First Trimix injection given in office today\par -Patient instructions and handout given to patient\par -Patient injected 0.2 cc of Trimix into the right corpora by himself\par -Extensive discussion of the risk of Trimix\par -Reviewed that if he has a sustained erection longer than 4 hours he must go to an emergency department immediately\par -Reviewed that he must alternate sides of injection\par -Reviewed that Trimix should not be used more than once per day and 3 times per week\par -Patient verbalized understanding of all of the above instructions\par -F/U in 3 months as he will [place] traveling [place] for the next 3 months

## 2022-12-30 ENCOUNTER — NON-APPOINTMENT (OUTPATIENT)
Age: 67
End: 2022-12-30

## 2023-01-23 ENCOUNTER — FORM ENCOUNTER (OUTPATIENT)
Age: 68
End: 2023-01-23

## 2023-02-15 ENCOUNTER — NON-APPOINTMENT (OUTPATIENT)
Age: 68
End: 2023-02-15

## 2023-03-08 ENCOUNTER — NON-APPOINTMENT (OUTPATIENT)
Age: 68
End: 2023-03-08

## 2023-03-15 ENCOUNTER — APPOINTMENT (OUTPATIENT)
Dept: OTHER | Facility: CLINIC | Age: 68
End: 2023-03-15
Payer: COMMERCIAL

## 2023-03-15 VITALS
BODY MASS INDEX: 31.32 KG/M2 | HEIGHT: 65 IN | DIASTOLIC BLOOD PRESSURE: 75 MMHG | RESPIRATION RATE: 17 BRPM | TEMPERATURE: 97.6 F | SYSTOLIC BLOOD PRESSURE: 116 MMHG | WEIGHT: 188 LBS | OXYGEN SATURATION: 97 % | HEART RATE: 90 BPM

## 2023-03-15 PROCEDURE — 99214 OFFICE O/P EST MOD 30 MIN: CPT

## 2023-03-15 RX ORDER — FLUTICASONE PROPIONATE 50 UG/1
50 SPRAY, METERED NASAL DAILY
Qty: 3 | Refills: 1 | Status: COMPLETED | COMMUNITY
Start: 2022-08-24 | End: 2023-03-15

## 2023-03-15 NOTE — REASON FOR VISIT
[Follow-Up] : a [unfilled] follow-up visit  [FreeTextEntry2] : 9/13/2001 [FreeTextEntry1] : visit conducted in Vietnamese

## 2023-03-15 NOTE — PHYSICAL EXAM
[General Appearance - Alert] : alert [General Appearance - In No Acute Distress] : in no acute distress [General Appearance - Well Nourished] : well nourished [General Appearance - Well Developed] : well developed [Sclera] : the sclera and conjunctiva were normal [PERRL With Normal Accommodation] : pupils were equal in size, round, and reactive to light [Extraocular Movements] : extraocular movements were intact [Outer Ear] : the ears and nose were normal in appearance [Neck Appearance] : the appearance of the neck was normal [Neck Cervical Mass (___cm)] : no neck mass was observed [Jugular Venous Distention Increased] : there was no jugular-venous distention [Thyroid Diffuse Enlargement] : the thyroid was not enlarged [Respiration, Rhythm And Depth] : normal respiratory rhythm and effort [Exaggerated Use Of Accessory Muscles For Inspiration] : no accessory muscle use [Auscultation Breath Sounds / Voice Sounds] : lungs were clear to auscultation bilaterally [Chest Palpation] : palpation of the chest revealed no abnormalities [Apical Impulse] : the apical impulse was normal [Heart Rate And Rhythm] : heart rate was normal and rhythm regular [Heart Sounds] : normal S1 and S2 [Heart Sounds Gallop] : no gallops [Murmurs] : no murmurs [Edema] : there was no peripheral edema [Bowel Sounds] : normal bowel sounds [Abdomen Soft] : soft [Abdomen Tenderness] : non-tender [] : no hepato-splenomegaly [Abdomen Mass (___ Cm)] : no abdominal mass palpated [Cervical Lymph Nodes Enlarged Posterior Bilaterally] : posterior cervical [Cervical Lymph Nodes Enlarged Anterior Bilaterally] : anterior cervical [Supraclavicular Lymph Nodes Enlarged Bilaterally] : supraclavicular [Axillary Lymph Nodes Enlarged Bilaterally] : axillary [FreeTextEntry1] : umbilical hernia non tender. obese.

## 2023-03-15 NOTE — PLAN
[FreeTextEntry1] : p. will stop flonase, continue saline for nose. pending to refer to ent if still bleeding after flonase. will discuss with pt at next visit. continue follow up for prostate ca. pt uses sertaline for prostate ca related conditions. patient has stopped working as per my recommendation. he is totally disabled. rtc in 4 mo. 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

## 2023-03-15 NOTE — ASSESSMENT
[Indicate if, in your opinion, the incident that the patient described was the competent medical cause of this injury/illness.] : The incident that the patient described was the competent medical cause of this injury/illness: Yes [Indicate if the patient's complaints are consistent with his/her history of the injury/illness.] : Indicate if the patient's complaints are consistent with his/her history of the injury/illness: Yes [Yes] : Yes, it is consistent [Can the patient return to usual work activities as indicated? If yes, indicate date___] : The patient can return to usual work activities on [unfilled] [Are there any work limitations? (If so, explain and quantify, including the anticipated duration of the limitations)] : There are work limitations. [FreeTextEntry1] : a. persistent epistaxis, may be due to use of flonase,or at least in part. under follow up for prostate ca. della. patient's conditions are active, permanent, persistent and chronic. he will need medical treatment and meds most probably for the rest of his life. he has stopped working as per my recommendation, especially due to his [facility] conditions. \par  [FreeTextEntry5] : 100 [FreeTextEntry6] : clinical examination, cxr, pft, biopsies [FreeTextEntry7] : avoid exposure to triggers, avoid driving.

## 2023-03-15 NOTE — HISTORY OF PRESENT ILLNESS
[Has the patient missed work because of the injury/illness?] : The patient has missed work because of the injury/illness. [No] : The patient is currently not working. [FreeTextEntry1] : certified for sinusitis, della and prostate ca\par s. patient continues with nasal bleeding, abundant, daily. he has not been to ent.  \par he did not tolerate cpap machine. continues with daytime somnolence. \par on f/u for his prostate ca. \par patient has stopped working as per my recommendation. \par pt has dm. \par wc: case approved.  [FreeTextEntry2] :  [FreeTextEntry3] : exposure to dusts and fumes, extremes of temperature and humidity [FreeTextEntry4] : nasal spray, antihistamine [FreeTextEntry5] : none [FreeTextEntry6] : july 2022

## 2023-03-24 ENCOUNTER — APPOINTMENT (OUTPATIENT)
Dept: UROLOGY | Facility: CLINIC | Age: 68
End: 2023-03-24
Payer: COMMERCIAL

## 2023-03-24 PROCEDURE — 99214 OFFICE O/P EST MOD 30 MIN: CPT

## 2023-03-24 NOTE — HISTORY OF PRESENT ILLNESS
[FreeTextEntry1] : Very pleasant 67-year-old gentleman who presents for follow-up of prostate cancer on active surveillance and erectile dysfunction.  He most recently underwent a surveillance prostate biopsy in January 2021 which demonstrated [place] group grade 1 prostate cancer in 2 cores on most recent surveillance biopsy.  PSA from 11/2022 was noted to decrease to 4.35.\par \par He has tried both Cialis and Viagra in the past without significant improvement in his erections.  Because of this he was started on Trimix. Reports that this is working very well.\par \par He reports that he wishes to try Cialis 5 mg again.\par \par Also reports testicular pain recently. Reports that this happened when he became aroused but did not ejaculate.

## 2023-03-24 NOTE — ASSESSMENT
[FreeTextEntry1] : Very pleasant 67-year-old gentleman who presents for follow-up of prostate cancer on active surveillance, erectile dysfunction\par -PSA 4.35 from November 2022\par -Most recent prostate biopsy demonstrated [place] group grade 1 prostate cancer in 2 separate cores from 1/2021\par -MRI from May 2022 demonstrated an overall suspicion score of PI-RADS 2 in the setting of a 46 cc prostate\par -We again discussed active surveillance protocol for prostate cancer\par -He wishes to try Cialis again\par -I discussed the risks, benefits, alternatives, and possible side effects of Cialis (tadalafil) therapy with the patient, including but not limited to headache, flushing, upset stomach, blurry vision, change in color vision, vision loss, and priapism with the patient.\par -Continue Trimix as well if Cialis is not effective\par -F/U in 6 months with PSA

## 2023-05-03 ENCOUNTER — APPOINTMENT (OUTPATIENT)
Dept: OTHER | Facility: CLINIC | Age: 68
End: 2023-05-03
Payer: COMMERCIAL

## 2023-05-03 VITALS
OXYGEN SATURATION: 100 % | DIASTOLIC BLOOD PRESSURE: 78 MMHG | TEMPERATURE: 98.6 F | WEIGHT: 188 LBS | BODY MASS INDEX: 31.32 KG/M2 | HEIGHT: 65 IN | SYSTOLIC BLOOD PRESSURE: 122 MMHG | HEART RATE: 73 BPM | RESPIRATION RATE: 18 BRPM

## 2023-05-03 PROCEDURE — 99214 OFFICE O/P EST MOD 30 MIN: CPT

## 2023-05-03 NOTE — PLAN
[FreeTextEntry1] : p. continue saline for nose.  will refer to ent. continue follow up for prostate ca. pt uses sertaline and tadalafil for prostate ca related conditions. patient has stopped working as per my recommendation. he is totally disabled. rtc in 4 mo. 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

## 2023-05-03 NOTE — ASSESSMENT
[Indicate if, in your opinion, the incident that the patient described was the competent medical cause of this injury/illness.] : The incident that the patient described was the competent medical cause of this injury/illness: Yes [Indicate if the patient's complaints are consistent with his/her history of the injury/illness.] : Indicate if the patient's complaints are consistent with his/her history of the injury/illness: Yes [Yes] : Yes, it is consistent [Can the patient return to usual work activities as indicated? If yes, indicate date___] : The patient can return to usual work activities on [unfilled] [Are there any work limitations? (If so, explain and quantify, including the anticipated duration of the limitations)] : There are work limitations. [FreeTextEntry1] : sergei today: fvc 2.81, 76%; fev1 2.35, 83%; ratio 83, 111%; flow volume is normal. study shows a mild restrictive impairment. there is a loss of some 18 ml fev1/yr. \par a. persistent epistaxis and persistent nasal symptoms. persistent della, pt did not tolerate cpap. under follow up for prostate ca. all of these patient's medical conditions are active, permanent, persistent and chronic. he will need medical treatment and meds most probably for the rest of his life. he has stopped working as per my recommendation, especially due to his [facility] conditions. \par EVALUATION OF IMPAIRMENT\par it is my conclusion, with sufficient degree of medical certainty, that patient has reached MMI. his conditions, however, and as stated, are chronic, permanent and active. patient will need medical treatment and medications most probably for the rest of his life. \par IMPAIRMENT RATING CHAPTER 17\par patient is totally disabled for his regular occupation mainly due to his della condition, which was not amenable to treatment with cpap. he has prostate cancer, which is a permanent condition. his sinusitis also adds to his degree of disability and imposes permanent environmental limitations. \par C4.3 completed.  [FreeTextEntry5] : 100 [FreeTextEntry6] : clinical examination, cxr, pft, biopsies [FreeTextEntry7] : avoid exposure to triggers, avoid driving.

## 2023-05-03 NOTE — HISTORY OF PRESENT ILLNESS
[FreeTextEntry1] : certified for sinusitis, della and prostate ca\par s. patient reports some decrease in his nasal bleeding frequency and severity, but it continues, despite stopping nasal spray. he has not been to ent. reports occasional nasal stuffiness and secretions. \par he did not tolerate cpap machine. continues with daytime somnolence. \par on f/u for his prostate ca. \par patient has stopped working as per my recommendation. \par pt has dm. \par wc: case approved. letter for  re: C4.3 [FreeTextEntry2] :  [FreeTextEntry3] : exposure to dusts and fumes, extremes of temperature and humidity [FreeTextEntry4] : nasal spray, antihistamine [FreeTextEntry5] : none [FreeTextEntry6] : july 2022

## 2023-05-03 NOTE — PHYSICAL EXAM
[General Appearance - Alert] : alert [General Appearance - In No Acute Distress] : in no acute distress [General Appearance - Well Nourished] : well nourished [General Appearance - Well Developed] : well developed [Sclera] : the sclera and conjunctiva were normal [PERRL With Normal Accommodation] : pupils were equal in size, round, and reactive to light [Extraocular Movements] : extraocular movements were intact [Outer Ear] : the ears and nose were normal in appearance [Neck Appearance] : the appearance of the neck was normal [Neck Cervical Mass (___cm)] : no neck mass was observed [Jugular Venous Distention Increased] : there was no jugular-venous distention [Thyroid Diffuse Enlargement] : the thyroid was not enlarged [Respiration, Rhythm And Depth] : normal respiratory rhythm and effort [Exaggerated Use Of Accessory Muscles For Inspiration] : no accessory muscle use [Auscultation Breath Sounds / Voice Sounds] : lungs were clear to auscultation bilaterally [Chest Palpation] : palpation of the chest revealed no abnormalities [Apical Impulse] : the apical impulse was normal [Heart Rate And Rhythm] : heart rate was normal and rhythm regular [Heart Sounds] : normal S1 and S2 [Heart Sounds Gallop] : no gallops [Murmurs] : no murmurs [Edema] : there was no peripheral edema [Bowel Sounds] : normal bowel sounds [Abdomen Soft] : soft [Abdomen Tenderness] : non-tender [] : no hepato-splenomegaly [Abdomen Mass (___ Cm)] : no abdominal mass palpated [Cervical Lymph Nodes Enlarged Posterior Bilaterally] : posterior cervical [Cervical Lymph Nodes Enlarged Anterior Bilaterally] : anterior cervical [Supraclavicular Lymph Nodes Enlarged Bilaterally] : supraclavicular [Axillary Lymph Nodes Enlarged Bilaterally] : axillary [FreeTextEntry1] : umbilical hernia non tender.

## 2023-05-14 ENCOUNTER — FORM ENCOUNTER (OUTPATIENT)
Age: 68
End: 2023-05-14

## 2023-05-24 ENCOUNTER — APPOINTMENT (OUTPATIENT)
Dept: OTHER | Facility: CLINIC | Age: 68
End: 2023-05-24

## 2023-05-24 ENCOUNTER — NON-APPOINTMENT (OUTPATIENT)
Age: 68
End: 2023-05-24

## 2023-05-31 ENCOUNTER — NON-APPOINTMENT (OUTPATIENT)
Age: 68
End: 2023-05-31

## 2023-06-07 ENCOUNTER — FORM ENCOUNTER (OUTPATIENT)
Age: 68
End: 2023-06-07

## 2023-06-14 ENCOUNTER — NON-APPOINTMENT (OUTPATIENT)
Age: 68
End: 2023-06-14

## 2023-06-14 ENCOUNTER — APPOINTMENT (OUTPATIENT)
Dept: OTHER | Facility: CLINIC | Age: 68
End: 2023-06-14

## 2023-06-21 ENCOUNTER — NON-APPOINTMENT (OUTPATIENT)
Age: 68
End: 2023-06-21

## 2023-06-25 ENCOUNTER — FORM ENCOUNTER (OUTPATIENT)
Age: 68
End: 2023-06-25

## 2023-06-26 ENCOUNTER — FORM ENCOUNTER (OUTPATIENT)
Age: 68
End: 2023-06-26

## 2023-06-28 ENCOUNTER — NON-APPOINTMENT (OUTPATIENT)
Age: 68
End: 2023-06-28

## 2023-07-05 ENCOUNTER — NON-APPOINTMENT (OUTPATIENT)
Age: 68
End: 2023-07-05

## 2023-08-02 ENCOUNTER — APPOINTMENT (OUTPATIENT)
Dept: OTHER | Facility: CLINIC | Age: 68
End: 2023-08-02
Payer: COMMERCIAL

## 2023-08-02 VITALS
DIASTOLIC BLOOD PRESSURE: 73 MMHG | BODY MASS INDEX: 31.32 KG/M2 | OXYGEN SATURATION: 98 % | WEIGHT: 188 LBS | TEMPERATURE: 98.3 F | HEART RATE: 74 BPM | SYSTOLIC BLOOD PRESSURE: 108 MMHG | RESPIRATION RATE: 18 BRPM | HEIGHT: 65 IN

## 2023-08-02 DIAGNOSIS — Z04.9 ENCOUNTER FOR EXAMINATION AND OBSERVATION FOR UNSPECIFIED REASON: ICD-10-CM

## 2023-08-02 PROCEDURE — 99214 OFFICE O/P EST MOD 30 MIN: CPT | Mod: 25

## 2023-08-02 PROCEDURE — 99397 PER PM REEVAL EST PAT 65+ YR: CPT | Mod: 25

## 2023-08-02 RX ORDER — SERTRALINE HYDROCHLORIDE 50 MG/1
50 TABLET, FILM COATED ORAL
Qty: 90 | Refills: 1 | Status: COMPLETED | COMMUNITY
Start: 2022-05-19 | End: 2023-08-02
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
## 2023-08-02 NOTE — HEALTH RISK ASSESSMENT
[Patient reported colonoscopy was normal] : Patient reported colonoscopy was normal [ColonoscopyDate] : 8/2018

## 2023-08-03 LAB
ALBUMIN SERPL ELPH-MCNC: 4.5 G/DL
ALP BLD-CCNC: 61 U/L
ALT SERPL-CCNC: 25 U/L
ANION GAP SERPL CALC-SCNC: 14 MMOL/L
APPEARANCE: CLEAR
AST SERPL-CCNC: 16 U/L
BILIRUB SERPL-MCNC: 0.5 MG/DL
BILIRUBIN URINE: NEGATIVE
BLOOD URINE: NEGATIVE
BUN SERPL-MCNC: 9 MG/DL
CALCIUM SERPL-MCNC: 9.2 MG/DL
CHLORIDE SERPL-SCNC: 96 MMOL/L
CHOLEST SERPL-MCNC: 137 MG/DL
CO2 SERPL-SCNC: 25 MMOL/L
COLOR: YELLOW
CREAT SERPL-MCNC: 0.61 MG/DL
EGFR: 106 ML/MIN/1.73M2
GLUCOSE QUALITATIVE U: 500 MG/DL
GLUCOSE SERPL-MCNC: 265 MG/DL
HDLC SERPL-MCNC: 39 MG/DL
KETONES URINE: ABNORMAL MG/DL
LDLC SERPL CALC-MCNC: 70 MG/DL
LEUKOCYTE ESTERASE URINE: NEGATIVE
NITRITE URINE: NEGATIVE
NONHDLC SERPL-MCNC: 97 MG/DL
PH URINE: 6
POTASSIUM SERPL-SCNC: 4.3 MMOL/L
PROT SERPL-MCNC: 7.1 G/DL
PROTEIN URINE: NEGATIVE MG/DL
SODIUM SERPL-SCNC: 135 MMOL/L
SPECIFIC GRAVITY URINE: 1.01
TRIGL SERPL-MCNC: 160 MG/DL
UROBILINOGEN URINE: 0.2 MG/DL

## 2023-08-09 ENCOUNTER — NON-APPOINTMENT (OUTPATIENT)
Age: 68
End: 2023-08-09

## 2023-08-16 ENCOUNTER — NON-APPOINTMENT (OUTPATIENT)
Age: 68
End: 2023-08-16

## 2023-08-22 NOTE — PLAN
[FreeTextEntry1] : p. meds renewed. continue follow up for prostate ca. c4 completed, partially disabled. rtc in 4 mo. \par i spent 5 in on the phone with pt.  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  Eye Protection Verbiage: Before proceeding with the stage, a plastic scleral shield was inserted. The globe was anesthetized with a few drops of 1% lidocaine with 1:100,000 epinephrine. Then, an appropriate sized scleral shield was chosen and coated with lacrilube ointment. The shield was gently inserted and left in place for the duration of each stage. After the stage was completed, the shield was gently removed.

## 2023-08-25 ENCOUNTER — APPOINTMENT (OUTPATIENT)
Dept: UROLOGY | Facility: CLINIC | Age: 68
End: 2023-08-25
Payer: COMMERCIAL

## 2023-08-25 VITALS
HEART RATE: 90 BPM | OXYGEN SATURATION: 97 % | DIASTOLIC BLOOD PRESSURE: 62 MMHG | HEIGHT: 65 IN | TEMPERATURE: 97.5 F | BODY MASS INDEX: 31.32 KG/M2 | SYSTOLIC BLOOD PRESSURE: 92 MMHG | WEIGHT: 188 LBS

## 2023-08-25 PROCEDURE — 99214 OFFICE O/P EST MOD 30 MIN: CPT

## 2023-08-25 NOTE — ASSESSMENT
[FreeTextEntry1] : Very pleasant 67-year-old gentleman who presents for follow-up of prostate cancer, erectile dysfunction -Continue Trimix for erectile dysfunction–refill sent to the pharmacy -PSA today -Urinalysis demonstrates no evidence of microscopic hematuria -Creatinine 0.61 -Follow-up in 6 months or sooner if necessary. -We discussed indications to proceed with repeat biopsy at this time.  He wishes to forego this right now

## 2023-08-25 NOTE — HISTORY OF PRESENT ILLNESS
[FreeTextEntry1] : Very pleasant 67-year-old gentleman who presents for follow-up of prostate cancer on active surveillance and erectile dysfunction.  He feels well.  He underwent a surveillance biopsy in January 2021 which demonstrated [place] group grade 1 prostate cancer in 2 separate cores.  PSA from 11/2022 was noted to decrease to 4.35.  He reports that Trimix continues to improve his erections significantly.  He is happy with Trimix.  No other complaints.

## 2023-08-28 LAB — PSA SERPL-MCNC: 4.76 NG/ML

## 2023-09-13 ENCOUNTER — NON-APPOINTMENT (OUTPATIENT)
Age: 68
End: 2023-09-13

## 2023-09-27 ENCOUNTER — NON-APPOINTMENT (OUTPATIENT)
Age: 68
End: 2023-09-27

## 2023-12-20 ENCOUNTER — APPOINTMENT (OUTPATIENT)
Dept: OTHER | Facility: CLINIC | Age: 68
End: 2023-12-20
Payer: COMMERCIAL

## 2023-12-20 VITALS
WEIGHT: 183 LBS | SYSTOLIC BLOOD PRESSURE: 100 MMHG | BODY MASS INDEX: 30.49 KG/M2 | HEART RATE: 86 BPM | TEMPERATURE: 98 F | HEIGHT: 65 IN | DIASTOLIC BLOOD PRESSURE: 64 MMHG | OXYGEN SATURATION: 96 %

## 2023-12-20 DIAGNOSIS — E10.628 TYPE 1 DIABETES MELLITUS WITH OTHER SKIN COMPLICATIONS: ICD-10-CM

## 2023-12-20 PROCEDURE — 99214 OFFICE O/P EST MOD 30 MIN: CPT

## 2023-12-20 RX ORDER — AZELASTINE HYDROCHLORIDE 137 UG/1
0.1 SPRAY, METERED NASAL TWICE DAILY
Qty: 1 | Refills: 6 | Status: COMPLETED | COMMUNITY
Start: 2023-08-02 | End: 2023-12-20

## 2023-12-20 RX ORDER — BACITRACIN 500 [IU]/G
500 OINTMENT TOPICAL DAILY
Qty: 1 | Refills: 6 | Status: COMPLETED | COMMUNITY
Start: 2017-07-05 | End: 2023-12-20

## 2023-12-20 NOTE — HISTORY OF PRESENT ILLNESS
[Has the patient missed work because of the injury/illness?] : The patient has missed work because of the injury/illness. [No] : The patient is currently not working. [FreeTextEntry1] : certified for sinusitis, della and prostate ca s. patient has been overall stable from his sinus condition. following up with ent. reports heartburn.  has not received cpap machine. continues on f/u for his prostate ca.  patient has stopped working as per my recommendation.  pt has dm.  wc: case settled with medical.  [FreeTextEntry2] :  [FreeTextEntry3] : exposure to dusts and fumes, extremes of temperature and humidity [FreeTextEntry4] : nasal spray, antihistamine [FreeTextEntry5] : none [FreeTextEntry6] : july 2022
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
## 2023-12-20 NOTE — REASON FOR VISIT
[Follow-Up] : a [unfilled] follow-up visit  [FreeTextEntry2] : 9/13/2001 [FreeTextEntry1] : visit conducted in [place]

## 2023-12-20 NOTE — PLAN
[FreeTextEntry1] : p. continue as per ent for nose, continue as per allergy md. continue follow up for prostate ca. omeprazole given as part of the management for his chronic sinusitis. patient has stopped working as per my recommendation. he is totally disabled. rtc in 4 mo.   [FreeTextEntry2] : permanently disabled [FreeTextEntry3] : guarded [FreeTextEntry4] : 4 mo.

## 2023-12-20 NOTE — ASSESSMENT
[Indicate if, in your opinion, the incident that the patient described was the competent medical cause of this injury/illness.] : The incident that the patient described was the competent medical cause of this injury/illness: Yes [Indicate if the patient's complaints are consistent with his/her history of the injury/illness.] : Indicate if the patient's complaints are consistent with his/her history of the injury/illness: Yes [Yes] : Yes, it is consistent [Can the patient return to usual work activities as indicated? If yes, indicate date___] : The patient can return to usual work activities on [unfilled] [Are there any work limitations? (If so, explain and quantify, including the anticipated duration of the limitations)] : There are work limitations. [FreeTextEntry1] : a. persistent della, pending new cpap machine. pt under follow up for prostate ca. chronic sinusitis, under ent follow up, overall stable on meds. all of these patient's medical conditions are active, permanent, persistent and chronic. he will need medical treatment and meds most probably for the rest of his life. he has stopped working as per my recommendation, especially due to his [facility] conditions.   [FreeTextEntry5] : 100 [FreeTextEntry6] : clinical examination, cxr, pft, biopsies [FreeTextEntry7] : avoid exposure to triggers, avoid driving.

## 2024-01-17 ENCOUNTER — NON-APPOINTMENT (OUTPATIENT)
Age: 69
End: 2024-01-17

## 2024-01-19 ENCOUNTER — APPOINTMENT (OUTPATIENT)
Dept: UROLOGY | Facility: CLINIC | Age: 69
End: 2024-01-19
Payer: COMMERCIAL

## 2024-01-19 VITALS
WEIGHT: 183 LBS | HEIGHT: 65 IN | SYSTOLIC BLOOD PRESSURE: 124 MMHG | RESPIRATION RATE: 17 BRPM | OXYGEN SATURATION: 97 % | BODY MASS INDEX: 30.49 KG/M2 | HEART RATE: 84 BPM | DIASTOLIC BLOOD PRESSURE: 79 MMHG | TEMPERATURE: 97.4 F

## 2024-01-19 DIAGNOSIS — E11.628 TYPE 2 DIABETES MELLITUS WITH OTHER SKIN COMPLICATIONS: ICD-10-CM

## 2024-01-19 PROCEDURE — 99214 OFFICE O/P EST MOD 30 MIN: CPT

## 2024-01-19 PROCEDURE — G2211 COMPLEX E/M VISIT ADD ON: CPT

## 2024-01-19 RX ORDER — PAPAVER/PHENTOLAMINE/ALPROSTAD 150-5-50
150-5-50 VIAL (EA) INTRACAVERNOSAL
Qty: 5 | Refills: 1 | Status: DISCONTINUED | COMMUNITY
Start: 2022-11-28 | End: 2024-01-19

## 2024-01-19 NOTE — HISTORY OF PRESENT ILLNESS
[FreeTextEntry1] : Very pleasant 68-year-old gentleman who presents for follow-up of prostate cancer on active surveillance and erectile dysfunction.  He feels well.  He reports that Trimix continues to improve his erections, however they have become more flaccid than in the past.  He is interested in increasing the dose of the medication at this time.  He underwent a surveillance biopsy in January 2021 which demonstrated [place] group grade 1 prostate cancer in 2 separate cores.  PSA most recently from August 2023 was 4.76, stable. Prostate MRI from May 2022 demonstrated an overall suspicion score of PI-RADS 2 in the setting of a 46 cc prostate.  He is very hesitant to undergo a surveillance biopsy again at this time, however would be amenable to doing another MRI

## 2024-03-05 ENCOUNTER — NON-APPOINTMENT (OUTPATIENT)
Age: 69
End: 2024-03-05

## 2024-03-05 ENCOUNTER — APPOINTMENT (OUTPATIENT)
Dept: GERIATRICS | Facility: CLINIC | Age: 69
End: 2024-03-05

## 2024-03-06 ENCOUNTER — APPOINTMENT (OUTPATIENT)
Dept: UROLOGY | Facility: CLINIC | Age: 69
End: 2024-03-06

## 2024-04-03 ENCOUNTER — APPOINTMENT (OUTPATIENT)
Dept: GASTROENTEROLOGY | Facility: CLINIC | Age: 69
End: 2024-04-03
Payer: COMMERCIAL

## 2024-04-03 VITALS
BODY MASS INDEX: 29.66 KG/M2 | SYSTOLIC BLOOD PRESSURE: 98 MMHG | DIASTOLIC BLOOD PRESSURE: 66 MMHG | TEMPERATURE: 97.3 F | WEIGHT: 178 LBS | OXYGEN SATURATION: 98 % | HEART RATE: 86 BPM | HEIGHT: 65 IN

## 2024-04-03 DIAGNOSIS — R10.13 EPIGASTRIC PAIN: ICD-10-CM

## 2024-04-03 DIAGNOSIS — R07.89 OTHER CHEST PAIN: ICD-10-CM

## 2024-04-03 DIAGNOSIS — Z85.46 PERSONAL HISTORY OF MALIGNANT NEOPLASM OF PROSTATE: ICD-10-CM

## 2024-04-03 DIAGNOSIS — Z87.09 PERSONAL HISTORY OF OTHER DISEASES OF THE RESPIRATORY SYSTEM: ICD-10-CM

## 2024-04-03 DIAGNOSIS — Z86.010 PERSONAL HISTORY OF COLONIC POLYPS: ICD-10-CM

## 2024-04-03 DIAGNOSIS — R19.7 DIARRHEA, UNSPECIFIED: ICD-10-CM

## 2024-04-03 DIAGNOSIS — Z86.39 PERSONAL HISTORY OF OTHER ENDOCRINE, NUTRITIONAL AND METABOLIC DISEASE: ICD-10-CM

## 2024-04-03 PROCEDURE — 99204 OFFICE O/P NEW MOD 45 MIN: CPT

## 2024-04-03 RX ORDER — PAPAVER/PHENTOLAMINE/ALPROSTAD 150-5-50
150-5-50 VIAL (EA) INTRACAVERNOSAL
Qty: 5 | Refills: 0 | Status: ACTIVE | COMMUNITY
Start: 2024-01-19

## 2024-04-03 RX ORDER — LEVOTHYROXINE SODIUM 112 UG/1
112 TABLET ORAL
Qty: 90 | Refills: 0 | Status: ACTIVE | COMMUNITY
Start: 2021-05-11

## 2024-04-03 RX ORDER — MOMETASONE FUROATE 1 MG/G
0.1 OINTMENT TOPICAL DAILY
Qty: 1 | Refills: 6 | Status: COMPLETED | COMMUNITY
Start: 2023-08-02 | End: 2024-04-03

## 2024-04-03 NOTE — ASSESSMENT
[FreeTextEntry1] : Abdominal Pain: The patient complains of abdominal pain. The patient is to avoid nonsteroidal anti-inflammatory drugs and aspirin. I recommend a trial of Pantoprazole 40 mg once a day for 3 months for the symptoms.   Dyspepsia: The patient complains of dyspeptic symptoms.  The patient was advised to abide by an anti-gas (low FOD-MAP) diet.  The patient was given a pamphlet for anti-gas (low FOD-MAP).  The patient and I reviewed the anti-gas (low FOD-MAP) diet at length. The patient is to start on a trial of Simethicone one tablet 4 times a day p.r.n. abdominal pain and gas. GERD: The patient was advised to avoid late-night meals and dietary indiscretions.  The patient was advised to avoid fried and fatty foods.  The patient was advised to abide by an anti-GERD diet. The patient was given a pamphlet for anti-GERD.  The patient and I reviewed the anti-GERD diet at length. I recommend a trial of Pantoprazole 40 mg once a day x 3 months for the symptoms. Atypical Chest Pain: The patient complains of atypical chest pain of unclear etiology.  The patient was advised to follow up with the PMD and cardiologist regarding evaluation for the atypical chest pain. The patient was told of possible etiologies such as cardiac, pulmonary, GI, musculoskeletal, stress and other causes for the atypical chest pain.  The patient agrees and will follow-up with the PMD and cardiologist.  Diarrhea: The patient complains of diarrhea.  I recommend a low residue diet. The patient is to avoid fiber supplementation. The patient is to consider starting a trial of a probiotic such as Align once a day.    If the symptoms persist, the patient may require sending stool studies for C+S, O+P x3, and C. difficile to assess for an infectious etiology of the diarrhea.   If the symptoms persist, the patient may require a trial of cholestyramine one packet once a day for possible bile induced diarrhea.  The patient agreed and will follow-up to reassess the symptoms.   Prior Endoscopic Procedures: The patient had a prior colonoscopy performed by another gastroenterologist, Dr. Chau.  I will try to obtain the prior colonoscopy reports.  The patient is to sign a record release to obtain those records. History of Colonic Polyps: The patient has a history of colonic polyps.  I recommend a repeat colonoscopy to reassess for colonic polyps.  The patient was told of the risks and benefits of the procedure.  The patient was told of the risks of perforation, emergency surgery, bleeding, infections and missed lesions.  The patient is to be on a clear liquid diet the entire day prior to the procedure. The patient is to complete the entire prescribed bowel prep the day prior to the procedure as directed. The patient is told not to drive, drink alcohol, use recreational drugs, exercise, or work the day of the procedure.  The patient was told of the need for an escort to accompany the patient home after the procedure. The patient is aware that the procedure may be cancelled if they fail to follow the directions.  The patient agreed and will schedule for the procedure. The patient can take the antihypertensive medication with a sip of water one hour prior to the procedure. The patient is to hold the diabetic medication (Xigduo) 3 days before and the morning of the procedure. The patient is to hold the diabetic medication (Rybelsus) for 7 days prior to the procedure. The patient is to be n.p.o. after midnight and bowel prep was given.  The patient is to return for the procedure.  Colonoscopy: I recommend a colonoscopy to assess the symptoms and for colonic polyps.  The patient was told of the risks and benefits of the procedure.  The patient was told of the risks of perforation, emergency surgery, bleeding, infections and missed lesions.  The patient is to be on a clear liquid diet the entire day prior to the procedure. The patient is to complete the entire prescribed bowel prep the day prior to the procedure as directed. The patient is told not to drive, drink alcohol, use recreational drugs, exercise, or work the day of the procedure.  The patient was told of the need for an escort to accompany the patient home after the procedure. The patient is aware that the procedure may be cancelled if they fail to follow the directions.  The patient agreed and will schedule for the procedure. The patient can take the antihypertensive medication with a sip of water one hour prior to the procedure. The patient can take the antihypertensive medication with a sip of water one hour prior to the procedure. The patient is to hold the diabetic medication (Xigduo) 3 days before and the morning of the procedure. The patient is to hold the diabetic medication (Rybelsus) for 7 days prior to the procedure. The patient is to be n.p.o. after midnight and bowel prep was given.  The patient is to return for the procedure. Upper Endoscopy: I recommend an upper endoscopy to assess for peptic ulcer disease versus esophagitis.  The patient was told of the risks and benefits of the procedure.  The patient was told of the risks of perforation, emergency surgery, bleeding, infections and missed lesions. The patient is told not to drive, drink alcohol, use recreational drugs, exercise, or work the day of the procedure.  The patient was told of the need for an escort to accompany the patient home after the procedure. The patient is aware that the procedure may be cancelled if they fail to follow the directions.  The patient agreed and will schedule for the procedure. The patient can take the antihypertensive medication with a sip of water one hour prior to the procedure. The patient is to be n.p.o. after midnight.  The patient is to return for the procedure. Follow-up: The patient is to follow-up in the office in 4 weeks to reassess the symptoms. The patient was told to call the office if any further problems.

## 2024-04-03 NOTE — HISTORY OF PRESENT ILLNESS
[FreeTextEntry1] : The patient is a 68-year-old  male with past medical history significant for hypertension, hypercholesterolemia, diabetes mellitus on Rybelsus and Xigduo and hypothyroidism who was referred to my office by Dr. Lonny Pack for abdominal pain, dyspepsia, gastroesophageal reflux disease and atypical chest pain. The patient also admits to having diarrhea, change in bowel habits and change in caliber of stool.  The patient also admits to a history of colon polyps. I was asked to render an opinion for consultation for the above complaints.   The patient states that he is feeling uncomfortable x 7 months.  The patient complains of abdominal pain.  The patient describes the abdominal pain as a burning, intermittent midepigastric discomfort that is nonradiating in nature.  The abdominal pain is unrelated to meals or stress.  The abdominal pain improves with passing gas or having a bowel movement.  The abdominal pain is described as mild in nature.  The abdominal pain occurs during the day.  The abdominal pain can occur at any time.   The abdominal pain never has awakened the patient from sleep.  The abdominal pain is not relieved with any medications.  The abdominal pain is associated with abdominal gas and bloating.  The patient denies any nausea or vomiting.  The patient complains of gastroesophageal reflux disease but denies any dysphagia. The gastroesophageal reflux disease is worse after meals and late at night and in the early morning.  The patient denies taking medications for the gastroesophageal reflux disease. The patient complains of atypical chest pain but denies any shortness of breath or palpitations.  The patient is being followed by his cardiologist, Dr. Shreyas TANNER .  According to the patient, the cardiac status is stable.  The chest pain is described as a burning, intermittent substernal discomfort that is nonradiating in nature.  The patient denies any diaphoresis. The chest pain is described as being 6 out of 10 in intensity.  The chest pain can occur at any time.  The chest pain is worse at night and early morning.  The chest pain is worse after meals and improves with passing gas.  The chest pain is unrelated to stress.  The chest pain never awakened the patient from sleep.  The patient complains of diarrhea but denies any constipation.  The patient has 4 to 6 bowel movements a day. The diarrhea is described as soft to watery in nature.   The patient complains of a change in bowel habits.  The patient complains of a change in caliber of stool.   The patient denies having mucus discharge with the bowel movements.  The patient denies any bright red blood per rectum, melena or hematemesis.  The patient denies any rectal pain or rectal pruritus. The patient complains of weight loss but denies any anorexia.  The patient admits to losing 5 pounds over the past 2 months. The patient attributes the weight loss to recent change in diet and recent travel. He denies any fevers or chills.  The patient denies any jaundice or pruritus.  The patient denies any back pain. The blood work performed on August 2, 2023 revealed no evidence of anemia with a hemoglobin/hematocrit level of 15.8/47.8, respectively, a normal WBC count of 5.04 K/UL, a normal platelet count of 229,000, and elevated blood glucose of 265 mg/dL, normal liver enzymes with total bilirubin of 0.5 mg/dL, a normal alkaline phosphatase/AST/ALT of 61/16/25 U/L, respectively, and elevated PSA of 4.76 ng/mL, a normal total cholesterol level of 137 mg/dL, and elevated triglyceride level of 160 mg/dL.  The urinalysis performed on August 2, 2023 revealed glucosuria of 500 mg/dL, and trace ketones. The patient admits to having a prior colonoscopy performed by another gastroenterologist, Dr. Jacklyn Chau over 6 years.  According to the patient, the colonoscopic findings revealed colon polyps, diverticulosis and internal hemorrhoids.   The patient denies any significant family history of GI problems.    (+) Prior smoking1/4 PPD x5 years stopped x 25 years, (-) ETOH, (-) IVDA  Physical Exam: General Appearance: Overweight, no acute distress ENT:  nose clear, ears unremarkable Eyes: No enteric sclera, conjunctiva clear. Neck: Supple, without masses  Respiratory: Breath sounds equal and bilateral, no wheezing no rales or rhonchi Cardiovascular: S1-S2 audible, no murmur, no rubs or gallops GI: (+) BS, soft, nontender, no rebound, no guarding, no masses Liver: liver edge palpated Rectal: not done Musculo-skeletal: Good motor strength, good range of motion, normal appearing extremities Skin: Normal appearing skin, no jaundice, no rashes or nodules Neurological: without focal motor or sensory deficits Patient is moving all extremities spontaneously and to command with normal muscle strength alert and oriented X3 Psychiatric: Good affect, not depressed, not anxious

## 2024-04-04 ENCOUNTER — NON-APPOINTMENT (OUTPATIENT)
Age: 69
End: 2024-04-04

## 2024-04-09 RX ORDER — RAMIPRIL 5 MG/1
5 CAPSULE ORAL
Qty: 90 | Refills: 0 | Status: ACTIVE | COMMUNITY
Start: 2022-03-29

## 2024-04-09 RX ORDER — ATORVASTATIN CALCIUM 40 MG/1
40 TABLET, FILM COATED ORAL
Qty: 90 | Refills: 0 | Status: ACTIVE | COMMUNITY
Start: 2022-02-10

## 2024-04-11 ENCOUNTER — TRANSCRIPTION ENCOUNTER (OUTPATIENT)
Age: 69
End: 2024-04-11

## 2024-04-12 ENCOUNTER — APPOINTMENT (OUTPATIENT)
Dept: GERIATRICS | Facility: CLINIC | Age: 69
End: 2024-04-12
Payer: MEDICARE

## 2024-04-12 VITALS
SYSTOLIC BLOOD PRESSURE: 98 MMHG | TEMPERATURE: 97.3 F | DIASTOLIC BLOOD PRESSURE: 74 MMHG | OXYGEN SATURATION: 97 % | WEIGHT: 178.13 LBS | BODY MASS INDEX: 29.68 KG/M2 | HEIGHT: 65 IN | HEART RATE: 78 BPM

## 2024-04-12 DIAGNOSIS — R41.89 OTHER SYMPTOMS AND SIGNS INVOLVING COGNITIVE FUNCTIONS AND AWARENESS: ICD-10-CM

## 2024-04-12 DIAGNOSIS — Z87.438 PERSONAL HISTORY OF OTHER DISEASES OF MALE GENITAL ORGANS: ICD-10-CM

## 2024-04-12 DIAGNOSIS — E03.9 HYPOTHYROIDISM, UNSPECIFIED: ICD-10-CM

## 2024-04-12 DIAGNOSIS — H91.93 UNSPECIFIED HEARING LOSS, BILATERAL: ICD-10-CM

## 2024-04-12 DIAGNOSIS — Z86.69 PERSONAL HISTORY OF OTHER DISEASES OF THE NERVOUS SYSTEM AND SENSE ORGANS: ICD-10-CM

## 2024-04-12 DIAGNOSIS — K08.89 OTHER SPECIFIED DISORDERS OF TEETH AND SUPPORTING STRUCTURES: ICD-10-CM

## 2024-04-12 DIAGNOSIS — Z71.89 OTHER SPECIFIED COUNSELING: ICD-10-CM

## 2024-04-12 DIAGNOSIS — Z71.41 ALCOHOL ABUSE COUNSELING AND SURVEILLANCE OF ALCOHOLIC: ICD-10-CM

## 2024-04-12 DIAGNOSIS — S02.5XXA FRACTURE OF TOOTH (TRAUMATIC), INITIAL ENCOUNTER FOR CLOSED FRACTURE: ICD-10-CM

## 2024-04-12 DIAGNOSIS — K21.9 GASTRO-ESOPHAGEAL REFLUX DISEASE W/OUT ESOPHAGITIS: ICD-10-CM

## 2024-04-12 DIAGNOSIS — Z86.010 PERSONAL HISTORY OF COLONIC POLYPS: ICD-10-CM

## 2024-04-12 DIAGNOSIS — Z86.39 PERSONAL HISTORY OF OTHER ENDOCRINE, NUTRITIONAL AND METABOLIC DISEASE: ICD-10-CM

## 2024-04-12 DIAGNOSIS — Z78.9 OTHER SPECIFIED HEALTH STATUS: ICD-10-CM

## 2024-04-12 DIAGNOSIS — Z23 ENCOUNTER FOR IMMUNIZATION: ICD-10-CM

## 2024-04-12 DIAGNOSIS — H61.23 IMPACTED CERUMEN, BILATERAL: ICD-10-CM

## 2024-04-12 PROCEDURE — 90677 PCV20 VACCINE IM: CPT

## 2024-04-12 PROCEDURE — G0443: CPT | Mod: 59

## 2024-04-12 PROCEDURE — 99204 OFFICE O/P NEW MOD 45 MIN: CPT | Mod: 25

## 2024-04-12 PROCEDURE — 99497 ADVNCD CARE PLAN 30 MIN: CPT | Mod: 25

## 2024-04-12 PROCEDURE — G0009: CPT

## 2024-04-12 RX ORDER — SITAGLIPTIN AND METFORMIN HYDROCHLORIDE 50; 500 MG/1; MG/1
50-500 TABLET, FILM COATED ORAL
Qty: 180 | Refills: 0 | Status: DISCONTINUED | COMMUNITY
Start: 2022-02-10 | End: 2024-04-12

## 2024-04-12 RX ORDER — PANTOPRAZOLE 40 MG/1
40 TABLET, DELAYED RELEASE ORAL DAILY
Qty: 30 | Refills: 3 | Status: DISCONTINUED | COMMUNITY
Start: 2024-04-03 | End: 2024-04-12

## 2024-04-16 ENCOUNTER — APPOINTMENT (OUTPATIENT)
Dept: UROLOGY | Facility: CLINIC | Age: 69
End: 2024-04-16
Payer: COMMERCIAL

## 2024-04-16 VITALS
DIASTOLIC BLOOD PRESSURE: 74 MMHG | BODY MASS INDEX: 29.32 KG/M2 | HEART RATE: 76 BPM | HEIGHT: 65 IN | SYSTOLIC BLOOD PRESSURE: 110 MMHG | WEIGHT: 176 LBS | TEMPERATURE: 98.1 F

## 2024-04-16 PROCEDURE — 99214 OFFICE O/P EST MOD 30 MIN: CPT

## 2024-04-16 PROCEDURE — G2211 COMPLEX E/M VISIT ADD ON: CPT

## 2024-04-16 NOTE — ASSESSMENT
[FreeTextEntry1] : Very pleasant 68-year-old gentleman who presents for follow-up of prostate cancer on active surveillance, last biopsy 2021, and erectile dysfunction -Continue Trimix -PSA 4.76 -Creatinine 0.61 -Repeat PSA today -Prostate MRI -We discussed recommendations to proceed with a surveillance biopsy at this time, however he wishes to forego this.  He is amenable to potentially doing this in a year or sooner if MRI is more concerning.  Patient is being seen today for evaluation and management of a chronic and longitudinal ongoing condition and I am of the primary treating physician

## 2024-04-16 NOTE — HISTORY OF PRESENT ILLNESS
[FreeTextEntry1] : Very pleasant 68-year-old gentleman who presents for follow-up of prostate cancer on active surveillance and erectile dysfunction.  He feels well.  He reports that Trimix continues to improve his erections.  At last visit we increased the dose of the medication and he reports that this significantly improved his erections more than the lower dose of the medication.  He underwent a surveillance biopsy in January 2021 which demonstrated Paz group grade 1 prostate cancer in 2 separate cores.  PSA most recently from August 2023 was 4.76, stable. Prostate MRI from May 2022 demonstrated an overall suspicion score of PI-RADS 2 in the setting of a 46 cc prostate.  He is very hesitant to undergo a surveillance biopsy again at this time, however would be amenable to doing another MRI

## 2024-04-17 PROBLEM — Z87.438 HISTORY OF BENIGN PROSTATIC HYPERPLASIA: Status: RESOLVED | Noted: 2024-04-17 | Resolved: 2024-04-17

## 2024-04-17 PROBLEM — Z71.41: Status: ACTIVE | Noted: 2024-04-17

## 2024-04-17 PROBLEM — S02.5XXA BROKEN TOOTH: Status: ACTIVE | Noted: 2024-04-12

## 2024-04-17 PROBLEM — Z78.9 SOCIAL ALCOHOL USE: Status: ACTIVE | Noted: 2024-04-17

## 2024-04-17 PROBLEM — H91.93 HEARING LOSS, BILATERAL: Status: RESOLVED | Noted: 2024-04-12 | Resolved: 2024-04-17

## 2024-04-17 PROBLEM — Z86.010 HISTORY OF COLONIC POLYPS: Status: RESOLVED | Noted: 2024-04-17 | Resolved: 2024-04-17

## 2024-04-17 PROBLEM — K21.9 GERD WITHOUT ESOPHAGITIS: Status: ACTIVE | Noted: 2024-04-03

## 2024-04-17 PROBLEM — Z87.438 HISTORY OF ERECTILE DYSFUNCTION: Status: RESOLVED | Noted: 2022-11-22 | Resolved: 2024-04-17

## 2024-04-17 PROBLEM — Z23 ENCOUNTER FOR IMMUNIZATION: Status: ACTIVE | Noted: 2024-04-17 | Resolved: 2024-05-01

## 2024-04-17 PROBLEM — H61.23 BILATERAL IMPACTED CERUMEN: Status: ACTIVE | Noted: 2024-04-12

## 2024-04-17 PROBLEM — K08.89 TOOTH LOOSE: Status: ACTIVE | Noted: 2024-04-12

## 2024-04-17 PROBLEM — Z71.89 ADVANCE CARE PLANNING: Status: ACTIVE | Noted: 2024-04-17

## 2024-04-17 PROBLEM — Z86.39 HISTORY OF HYPERLIPIDEMIA: Status: RESOLVED | Noted: 2024-04-17 | Resolved: 2024-04-17

## 2024-04-17 PROBLEM — R41.89 IMPAIRED COGNITION: Status: ACTIVE | Noted: 2024-04-17

## 2024-04-17 PROBLEM — E03.9 ACQUIRED HYPOTHYROIDISM: Status: RESOLVED | Noted: 2024-04-17 | Resolved: 2024-04-17

## 2024-04-17 PROBLEM — K21.9 GERD (GASTROESOPHAGEAL REFLUX DISEASE): Status: ACTIVE | Noted: 2024-04-17

## 2024-04-17 RX ORDER — POLYETHYLENE GLYCOL 3350 AND ELECTROLYTES WITH LEMON FLAVOR 236; 22.74; 6.74; 5.86; 2.97 G/4L; G/4L; G/4L; G/4L; G/4L
236 POWDER, FOR SOLUTION ORAL
Qty: 1 | Refills: 0 | Status: DISCONTINUED | COMMUNITY
Start: 2024-04-03 | End: 2024-04-17

## 2024-04-17 RX ORDER — SEMAGLUTIDE 1.34 MG/ML
4 INJECTION, SOLUTION SUBCUTANEOUS
Refills: 0 | Status: ACTIVE | COMMUNITY
Start: 2024-04-17

## 2024-04-17 RX ORDER — DAPAGLIFLOZIN AND METFORMIN HYDROCHLORIDE 5; 1000 MG/1; MG/1
5-1000 TABLET, FILM COATED, EXTENDED RELEASE ORAL
Refills: 0 | Status: ACTIVE | COMMUNITY

## 2024-04-17 RX ORDER — SILVER SULFADIAZINE 10 MG/G
1 CREAM TOPICAL TWICE DAILY
Qty: 1 | Refills: 1 | Status: DISCONTINUED | COMMUNITY
Start: 2023-12-20 | End: 2024-04-17

## 2024-04-17 RX ORDER — DAPAGLIFLOZIN 10 MG/1
10 TABLET, FILM COATED ORAL
Qty: 90 | Refills: 0 | Status: DISCONTINUED | COMMUNITY
Start: 2022-02-10 | End: 2024-04-17

## 2024-04-17 NOTE — PHYSICAL EXAM
[Alert] : alert [No Acute Distress] : in no acute distress [Normal Outer Ear/Nose] : the ears and nose were normal in appearance [Normal Appearance] : the appearance of the neck was normal [Supple] : the neck was supple [No Respiratory Distress] : no respiratory distress [No Acc Muscle Use] : no accessory muscle use [Respiration, Rhythm And Depth] : normal respiratory rhythm and effort [Auscultation Breath Sounds / Voice Sounds] : lungs were clear to auscultation bilaterally [Heart Rate And Rhythm] : heart rate was normal and rhythm regular [Bowel Sounds] : normal bowel sounds [Abdomen Tenderness] : non-tender [Abdomen Soft] : soft [No Spinal Tenderness] : no spinal tenderness [Normal Color / Pigmentation] : normal skin color and pigmentation [Normal Turgor] : normal skin turgor [No Focal Deficits] : no focal deficits [Normal Affect] : the affect was normal [Normal Mood] : the mood was normal [de-identified] : B/l ears with cerumen in the ear canals, non-impacted, TM is fine
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
## 2024-04-18 LAB — PSA SERPL-MCNC: 5.38 NG/ML

## 2024-04-26 ENCOUNTER — APPOINTMENT (OUTPATIENT)
Dept: MRI IMAGING | Facility: CLINIC | Age: 69
End: 2024-04-26
Payer: COMMERCIAL

## 2024-04-26 ENCOUNTER — RESULT REVIEW (OUTPATIENT)
Age: 69
End: 2024-04-26

## 2024-04-26 PROCEDURE — 76498P: CUSTOM

## 2024-04-26 PROCEDURE — A9585: CPT | Mod: JW

## 2024-04-26 PROCEDURE — 72197 MRI PELVIS W/O & W/DYE: CPT

## 2024-04-29 ENCOUNTER — APPOINTMENT (OUTPATIENT)
Dept: GERIATRICS | Facility: CLINIC | Age: 69
End: 2024-04-29
Payer: MEDICARE

## 2024-04-29 VITALS
OXYGEN SATURATION: 98 % | TEMPERATURE: 97.2 F | WEIGHT: 180 LBS | HEART RATE: 79 BPM | DIASTOLIC BLOOD PRESSURE: 79 MMHG | SYSTOLIC BLOOD PRESSURE: 117 MMHG | BODY MASS INDEX: 29.99 KG/M2 | HEIGHT: 65 IN

## 2024-04-29 DIAGNOSIS — E11.9 TYPE 2 DIABETES MELLITUS W/OUT COMPLICATIONS: ICD-10-CM

## 2024-04-29 DIAGNOSIS — I15.2 TYPE 2 DIABETES MELLITUS WITH OTHER CIRCULATORY COMPLICATIONS: ICD-10-CM

## 2024-04-29 DIAGNOSIS — G31.84 MILD COGNITIVE IMPAIRMENT, SO STATED: ICD-10-CM

## 2024-04-29 DIAGNOSIS — E78.5 TYPE 2 DIABETES MELLITUS WITH OTHER SPECIFIED COMPLICATION: ICD-10-CM

## 2024-04-29 DIAGNOSIS — E11.59 TYPE 2 DIABETES MELLITUS WITH OTHER CIRCULATORY COMPLICATIONS: ICD-10-CM

## 2024-04-29 DIAGNOSIS — J45.909 UNSPECIFIED ASTHMA, UNCOMPLICATED: ICD-10-CM

## 2024-04-29 DIAGNOSIS — E11.69 TYPE 2 DIABETES MELLITUS WITH OTHER SPECIFIED COMPLICATION: ICD-10-CM

## 2024-04-29 PROCEDURE — 99483 ASSMT & CARE PLN PT COG IMP: CPT

## 2024-05-01 ENCOUNTER — APPOINTMENT (OUTPATIENT)
Dept: OTHER | Facility: CLINIC | Age: 69
End: 2024-05-01
Payer: COMMERCIAL

## 2024-05-01 VITALS
WEIGHT: 183 LBS | DIASTOLIC BLOOD PRESSURE: 71 MMHG | OXYGEN SATURATION: 97 % | BODY MASS INDEX: 30.49 KG/M2 | TEMPERATURE: 97.4 F | SYSTOLIC BLOOD PRESSURE: 105 MMHG | HEIGHT: 65 IN | RESPIRATION RATE: 16 BRPM | HEART RATE: 82 BPM

## 2024-05-01 DIAGNOSIS — G47.33 OBSTRUCTIVE SLEEP APNEA (ADULT) (PEDIATRIC): ICD-10-CM

## 2024-05-01 DIAGNOSIS — J32.9 CHRONIC SINUSITIS, UNSPECIFIED: ICD-10-CM

## 2024-05-01 PROCEDURE — 99214 OFFICE O/P EST MOD 30 MIN: CPT

## 2024-05-01 RX ORDER — TADALAFIL 5 MG/1
5 TABLET ORAL
Qty: 75 | Refills: 1 | Status: COMPLETED | COMMUNITY
Start: 2023-03-24 | End: 2024-05-01

## 2024-05-01 RX ORDER — BUDESONIDE 0.25 MG/2ML
0.25 INHALANT ORAL DAILY
Qty: 3 | Refills: 1 | Status: ACTIVE | COMMUNITY
Start: 2023-08-02 | End: 1900-01-01

## 2024-05-01 RX ORDER — FEXOFENADINE HCL 60 MG/1
60 TABLET, FILM COATED ORAL DAILY
Qty: 20 | Refills: 6 | Status: COMPLETED | COMMUNITY
Start: 2023-08-02 | End: 2024-05-01

## 2024-05-01 RX ORDER — OMEPRAZOLE 40 MG/1
40 CAPSULE, DELAYED RELEASE ORAL
Qty: 90 | Refills: 1 | Status: ACTIVE | COMMUNITY
Start: 2022-03-08 | End: 1900-01-01

## 2024-05-01 RX ORDER — TADALAFIL 20 MG/1
20 TABLET ORAL
Qty: 18 | Refills: 1 | Status: ACTIVE | COMMUNITY
Start: 2024-05-01 | End: 1900-01-01

## 2024-05-01 RX ORDER — FEXOFENADINE HCL 180 MG/1
180 TABLET, FILM COATED ORAL
Qty: 90 | Refills: 1 | Status: ACTIVE | COMMUNITY
Start: 2024-05-01 | End: 1900-01-01

## 2024-05-01 NOTE — PLAN
[FreeTextEntry1] : continue as per ent for nose, continue as per allergy md. continue follow up for prostate ca. omeprazole given as part of the management for his chronic sinusitis. patient has stopped working as per my recommendation. he is totally disabled. rtc in 4 mo. will increase tadalafil as per pt's request. reorder cpap equipment. meds to mail order.  [FreeTextEntry2] : permanently disabled [FreeTextEntry3] : guarded [FreeTextEntry4] : 4 mo.

## 2024-05-01 NOTE — HISTORY OF PRESENT ILLNESS
[FreeTextEntry1] : certified for sinusitis, della and prostate ca s. patient has been overall stable from his sinus condition. continues with occasional nasal bleeding. has not been back to ent. using nasal washings as recommended.   reports heartburn. has been referred to gi by pmd.  still has not received cpap machine. continues on f/u for his prostate ca. had mri, has to return to pmd about this. reports cialis 5 mg does not work for him.  patient has stopped working as per my recommendation.  pt has dm.  wc: case settled with medical.  [FreeTextEntry2] :  [FreeTextEntry3] : exposure to dusts and fumes, extremes of temperature and humidity [FreeTextEntry4] : nasal spray, antihistamine [FreeTextEntry5] : none [FreeTextEntry6] : july 2022

## 2024-05-01 NOTE — ASSESSMENT
[FreeTextEntry1] : persistent della, pending new cpap machine. pt under follow up for prostate ca. chronic sinusitis, under ent follow up, overall stable on meds. all of these patient's medical conditions are active, permanent, persistent and chronic. he will need medical treatment and meds most probably for the rest of his life. he has stopped working as per my recommendation, especially due to his [facility] conditions.   [FreeTextEntry5] : 100 [FreeTextEntry6] : clinical examination, cxr, pft, biopsies [FreeTextEntry7] : avoid exposure to triggers, avoid driving.

## 2024-05-03 ENCOUNTER — NON-APPOINTMENT (OUTPATIENT)
Age: 69
End: 2024-05-03

## 2024-05-03 DIAGNOSIS — Z12.9 ENCOUNTER FOR SCREENING FOR MALIGNANT NEOPLASM, SITE UNSPECIFIED: ICD-10-CM

## 2024-05-03 LAB
ALBUMIN SERPL ELPH-MCNC: 4.9 G/DL
ALP BLD-CCNC: 75 U/L
ALT SERPL-CCNC: 31 U/L
ANION GAP SERPL CALC-SCNC: 17 MMOL/L
AST SERPL-CCNC: 15 U/L
BASOPHILS # BLD AUTO: 0.02 K/UL
BASOPHILS NFR BLD AUTO: 0.3 %
BILIRUB SERPL-MCNC: 0.4 MG/DL
BUN SERPL-MCNC: 14 MG/DL
CALCIUM SERPL-MCNC: 9.8 MG/DL
CHLORIDE SERPL-SCNC: 99 MMOL/L
CHOLEST SERPL-MCNC: 150 MG/DL
CO2 SERPL-SCNC: 20 MMOL/L
CREAT SERPL-MCNC: 0.65 MG/DL
EGFR: 103 ML/MIN/1.73M2
EOSINOPHIL # BLD AUTO: 0.08 K/UL
EOSINOPHIL NFR BLD AUTO: 1.2 %
ESTIMATED AVERAGE GLUCOSE: 174 MG/DL
FOLATE SERPL-MCNC: 11.1 NG/ML
GLUCOSE SERPL-MCNC: 144 MG/DL
HBA1C MFR BLD HPLC: 7.7 %
HCT VFR BLD CALC: 49.8 %
HDLC SERPL-MCNC: 43 MG/DL
HGB BLD-MCNC: 17 G/DL
IMM GRANULOCYTES NFR BLD AUTO: 0.3 %
LDLC SERPL CALC-MCNC: 84 MG/DL
LYMPHOCYTES # BLD AUTO: 2.11 K/UL
LYMPHOCYTES NFR BLD AUTO: 31.9 %
MAN DIFF?: NORMAL
MCHC RBC-ENTMCNC: 29.6 PG
MCHC RBC-ENTMCNC: 34.1 GM/DL
MCV RBC AUTO: 86.8 FL
MONOCYTES # BLD AUTO: 0.49 K/UL
MONOCYTES NFR BLD AUTO: 7.4 %
NEUTROPHILS # BLD AUTO: 3.9 K/UL
NEUTROPHILS NFR BLD AUTO: 58.9 %
NONHDLC SERPL-MCNC: 106 MG/DL
PLATELET # BLD AUTO: 276 K/UL
POTASSIUM SERPL-SCNC: 4.3 MMOL/L
PROT SERPL-MCNC: 8.3 G/DL
RBC # BLD: 5.74 M/UL
RBC # FLD: 12.5 %
SODIUM SERPL-SCNC: 136 MMOL/L
TRIGL SERPL-MCNC: 125 MG/DL
TSH SERPL-ACNC: 1.33 UIU/ML
VIT B12 SERPL-MCNC: 545 PG/ML
WBC # FLD AUTO: 6.62 K/UL

## 2024-05-05 PROBLEM — G31.84 MILD COGNITIVE IMPAIRMENT: Status: ACTIVE | Noted: 2024-05-05

## 2024-05-05 PROBLEM — E11.69 HYPERLIPIDEMIA ASSOCIATED WITH TYPE 2 DIABETES MELLITUS: Status: ACTIVE | Noted: 2024-04-17

## 2024-05-05 PROBLEM — J45.909 REACTIVE AIRWAY DISEASE: Status: ACTIVE | Noted: 2024-04-17

## 2024-05-05 PROBLEM — E11.59 HYPERTENSION ASSOCIATED WITH TYPE 2 DIABETES MELLITUS: Status: ACTIVE | Noted: 2024-04-17

## 2024-05-05 PROBLEM — E11.9 TYPE 2 DIABETES MELLITUS WITHOUT COMPLICATION, WITHOUT LONG-TERM CURRENT USE OF INSULIN: Status: ACTIVE | Noted: 2024-04-17

## 2024-05-07 ENCOUNTER — APPOINTMENT (OUTPATIENT)
Dept: UROLOGY | Facility: CLINIC | Age: 69
End: 2024-05-07
Payer: COMMERCIAL

## 2024-05-07 VITALS
SYSTOLIC BLOOD PRESSURE: 108 MMHG | HEIGHT: 65 IN | HEART RATE: 82 BPM | TEMPERATURE: 97.5 F | BODY MASS INDEX: 29.66 KG/M2 | DIASTOLIC BLOOD PRESSURE: 71 MMHG | WEIGHT: 178 LBS | OXYGEN SATURATION: 98 %

## 2024-05-07 DIAGNOSIS — N52.9 MALE ERECTILE DYSFUNCTION, UNSPECIFIED: ICD-10-CM

## 2024-05-07 DIAGNOSIS — C61 MALIGNANT NEOPLASM OF PROSTATE: ICD-10-CM

## 2024-05-07 PROCEDURE — G2211 COMPLEX E/M VISIT ADD ON: CPT

## 2024-05-07 PROCEDURE — 99214 OFFICE O/P EST MOD 30 MIN: CPT

## 2024-05-07 NOTE — ASSESSMENT
[FreeTextEntry1] : Very pleasant 68-year-old gentleman who presents for follow-up of erectile dysfunction, prostate cancer -Continue Trimix for erectile dysfunction -MRI images reviewed demonstrating an overall suspicion score of PI-RADS 2, stable -Repeat PSA in 6 months and follow-up at that time  Patient is being seen today for evaluation and management of a chronic and longitudinal ongoing condition and I am of the primary treating physician

## 2024-05-07 NOTE — HISTORY OF PRESENT ILLNESS
[FreeTextEntry1] : Very pleasant 68-year-old gentleman who presents for follow-up of prostate cancer on active surveillance and erectile dysfunction.  He feels well.  He reports that Trimix continues to improve his erections.   He underwent a surveillance biopsy in January 2021 which demonstrated Paz group grade 1 prostate cancer in 2 separate cores.   Prostate MRI from May 2022 demonstrated an overall suspicion score of PI-RADS 2 in the setting of a 46 cc prostate.  Repeat MRI 4/2024 demonstrated overall suspicion score of PI-RADS 2.

## 2024-05-29 ENCOUNTER — TRANSCRIPTION ENCOUNTER (OUTPATIENT)
Age: 69
End: 2024-05-29

## 2024-05-31 ENCOUNTER — NON-APPOINTMENT (OUTPATIENT)
Age: 69
End: 2024-05-31

## 2024-05-31 ENCOUNTER — APPOINTMENT (OUTPATIENT)
Dept: OTOLARYNGOLOGY | Facility: CLINIC | Age: 69
End: 2024-05-31
Payer: MEDICARE

## 2024-05-31 VITALS
TEMPERATURE: 97.9 F | HEIGHT: 65 IN | SYSTOLIC BLOOD PRESSURE: 117 MMHG | DIASTOLIC BLOOD PRESSURE: 79 MMHG | WEIGHT: 178 LBS | HEART RATE: 76 BPM | BODY MASS INDEX: 29.66 KG/M2

## 2024-05-31 DIAGNOSIS — J30.2 OTHER SEASONAL ALLERGIC RHINITIS: ICD-10-CM

## 2024-05-31 DIAGNOSIS — G47.33 OBSTRUCTIVE SLEEP APNEA (ADULT) (PEDIATRIC): ICD-10-CM

## 2024-05-31 DIAGNOSIS — H90.3 SENSORINEURAL HEARING LOSS, BILATERAL: ICD-10-CM

## 2024-05-31 DIAGNOSIS — R04.0 EPISTAXIS: ICD-10-CM

## 2024-05-31 DIAGNOSIS — H61.22 IMPACTED CERUMEN, LEFT EAR: ICD-10-CM

## 2024-05-31 PROCEDURE — 92557 COMPREHENSIVE HEARING TEST: CPT

## 2024-05-31 PROCEDURE — 92567 TYMPANOMETRY: CPT

## 2024-05-31 PROCEDURE — 99203 OFFICE O/P NEW LOW 30 MIN: CPT | Mod: 25

## 2024-05-31 NOTE — HISTORY OF PRESENT ILLNESS
[de-identified] : 69 yo male [facility] affiliated, comes in  with hearing change that have been bilateral and progressive. He denies ringing in the ears or dizziness. He does report some wax. No dizziness or ear pain.  He has issues with chronic nasal congestion. He uses nasal sprays with no benefit. He suspects pollen allergy and takes over the counter medication. No history of sinus infections. he uses budesonide liquid in his nose daily that he squirts in. He notes blood tinge from nose every day when he blows his nose. He does have MACHELLE but reports that he doesnt tolerate the CPAP so he doesnt use it. He snores.

## 2024-05-31 NOTE — PROCEDURE
[FreeTextEntry1] : cerumen removal  [FreeTextEntry2] : cerumen impaction [FreeTextEntry3] : mild cerumen removed from the left EAC with currette

## 2024-05-31 NOTE — PHYSICAL EXAM
[Midline] : trachea located in midline position [de-identified] : cerumen removed from the left EAC with currette, normal EAC otherwise  [Normal] : external appearance is normal [de-identified] : hypertrophic [de-identified] : anterior septum with dried bloody secretions bilaterally

## 2024-05-31 NOTE — REVIEW OF SYSTEMS
[Seasonal Allergies] : seasonal allergies [Hearing Loss] : hearing loss [As Noted in HPI] : as noted in HPI [Nasal Congestion] : nasal congestion [Problem Snoring] : problem snoring [Snoring With Pauses] : snoring with pauses

## 2024-05-31 NOTE — ASSESSMENT
[FreeTextEntry1] : 69 yo male [employer] affiliated, with a history of seasonal allergies and MACHELLE (not using CPAP) here for hearing evaluation and nasal congestion complaints  Bilateral SNHL - audiology evaluation today with SNHL - recommend hearing aids; given clearance  MACHELLE - split study in chart from 7/2023 confirms moderate MACHELLE with improvement with CPAP at a pressure of 11 cm H2O - discussed importance of CPAP compliance and advised him to follow up with pulmonologist/sleep medicine doctor - if does not tolerate CPAP may consider dental appliance or Inspire implant  chronic rhinitis/sinusitis with epistaxis: - advised he should not be instilling the budesonide directly into the nose but adding to a saline rinse and irrigating with it daily - this should reduce irritation to septum and bleeding - aquaphor or saline gel to septum - f/u PRN

## 2024-05-31 NOTE — DATA REVIEWED
[de-identified] : outside sleep study in chart 7/2023 :moderate MACHELLE with AHI 23.6 split study shows success with CPAP at pressure of 11 cm H2O

## 2024-05-31 NOTE — END OF VISIT
[FreeTextEntry3] : I personally saw and examined Mr. PAIGE SETH in detail this visit today. I personally reviewed the HPI, PMH, FH, SH, ROS and medications/allergies. I have spoken to ERICA Wei regarding the history and have personally determined the assessment and plan of care, and documented this myself. I was present and participated in all key portions of the encounter and all procedures noted above. I have made changes in the body of the note where appropriate.   Attesting Faculty: See Attending Signature Below

## 2024-05-31 NOTE — CONSULT LETTER
[Dear  ___] : Dear  [unfilled], [Consult Letter:] : I had the pleasure of evaluating your patient, [unfilled]. [Please see my note below.] : Please see my note below. [Consult Closing:] : Thank you very much for allowing me to participate in the care of this patient.  If you have any questions, please do not hesitate to contact me. [Sincerely,] : Sincerely, [FreeTextEntry3] :  Lola Zepeda MD Otolaryngology and Cranial Base Surgery  Attending Physician- Department of Otolaryngology and Head & Neck Surgery  [facility] [facility] Office: [phone]  Fax: [phone]

## 2024-05-31 NOTE — REASON FOR VISIT
[Initial Evaluation] : an initial evaluation for [FreeTextEntry2] : 68 year old male with perceived hearing loss

## 2024-06-07 ENCOUNTER — NON-APPOINTMENT (OUTPATIENT)
Age: 69
End: 2024-06-07

## 2024-06-12 ENCOUNTER — EMERGENCY (EMERGENCY)
Facility: HOSPITAL | Age: 69
LOS: 1 days | Discharge: HOME | End: 2024-06-12
Admitting: EMERGENCY MEDICINE
Payer: MEDICARE

## 2024-06-12 VITALS
OXYGEN SATURATION: 94 % | SYSTOLIC BLOOD PRESSURE: 117 MMHG | WEIGHT: 179.9 LBS | DIASTOLIC BLOOD PRESSURE: 79 MMHG | HEART RATE: 88 BPM | TEMPERATURE: 98 F | RESPIRATION RATE: 20 BRPM

## 2024-06-12 DIAGNOSIS — Z98.890 OTHER SPECIFIED POSTPROCEDURAL STATES: Chronic | ICD-10-CM

## 2024-06-12 LAB
A1C WITH ESTIMATED AVERAGE GLUCOSE RESULT: 9.1 % — HIGH (ref 4–5.6)
ALBUMIN SERPL ELPH-MCNC: 4.8 G/DL — SIGNIFICANT CHANGE UP (ref 3.3–5)
ALP SERPL-CCNC: 74 U/L — SIGNIFICANT CHANGE UP (ref 40–120)
ALT FLD-CCNC: 35 U/L — SIGNIFICANT CHANGE UP (ref 4–41)
ANION GAP SERPL CALC-SCNC: 15 MMOL/L — HIGH (ref 7–14)
AST SERPL-CCNC: 24 U/L — SIGNIFICANT CHANGE UP (ref 4–40)
BASOPHILS # BLD AUTO: 0.03 K/UL — SIGNIFICANT CHANGE UP (ref 0–0.2)
BASOPHILS NFR BLD AUTO: 0.3 % — SIGNIFICANT CHANGE UP (ref 0–2)
BILIRUB SERPL-MCNC: 0.8 MG/DL — SIGNIFICANT CHANGE UP (ref 0.2–1.2)
BUN SERPL-MCNC: 12 MG/DL — SIGNIFICANT CHANGE UP (ref 7–23)
CALCIUM SERPL-MCNC: 9.4 MG/DL — SIGNIFICANT CHANGE UP (ref 8.4–10.5)
CHLORIDE SERPL-SCNC: 101 MMOL/L — SIGNIFICANT CHANGE UP (ref 98–107)
CO2 SERPL-SCNC: 21 MMOL/L — LOW (ref 22–31)
CREAT SERPL-MCNC: 0.69 MG/DL — SIGNIFICANT CHANGE UP (ref 0.5–1.3)
EGFR: 101 ML/MIN/1.73M2 — SIGNIFICANT CHANGE UP
EOSINOPHIL # BLD AUTO: 0.09 K/UL — SIGNIFICANT CHANGE UP (ref 0–0.5)
EOSINOPHIL NFR BLD AUTO: 1 % — SIGNIFICANT CHANGE UP (ref 0–6)
ESTIMATED AVERAGE GLUCOSE: 214 — SIGNIFICANT CHANGE UP
FLUAV AG NPH QL: SIGNIFICANT CHANGE UP
FLUBV AG NPH QL: SIGNIFICANT CHANGE UP
GLUCOSE SERPL-MCNC: 157 MG/DL — HIGH (ref 70–99)
HCT VFR BLD CALC: 47.3 % — SIGNIFICANT CHANGE UP (ref 39–50)
HGB BLD-MCNC: 17.1 G/DL — HIGH (ref 13–17)
IANC: 5.91 K/UL — SIGNIFICANT CHANGE UP (ref 1.8–7.4)
IMM GRANULOCYTES NFR BLD AUTO: 0.3 % — SIGNIFICANT CHANGE UP (ref 0–0.9)
LIDOCAIN IGE QN: 24 U/L — SIGNIFICANT CHANGE UP (ref 7–60)
LYMPHOCYTES # BLD AUTO: 2.11 K/UL — SIGNIFICANT CHANGE UP (ref 1–3.3)
LYMPHOCYTES # BLD AUTO: 24.3 % — SIGNIFICANT CHANGE UP (ref 13–44)
MCHC RBC-ENTMCNC: 31 PG — SIGNIFICANT CHANGE UP (ref 27–34)
MCHC RBC-ENTMCNC: 36.2 GM/DL — HIGH (ref 32–36)
MCV RBC AUTO: 85.7 FL — SIGNIFICANT CHANGE UP (ref 80–100)
MONOCYTES # BLD AUTO: 0.53 K/UL — SIGNIFICANT CHANGE UP (ref 0–0.9)
MONOCYTES NFR BLD AUTO: 6.1 % — SIGNIFICANT CHANGE UP (ref 2–14)
NEUTROPHILS # BLD AUTO: 5.91 K/UL — SIGNIFICANT CHANGE UP (ref 1.8–7.4)
NEUTROPHILS NFR BLD AUTO: 68 % — SIGNIFICANT CHANGE UP (ref 43–77)
NRBC # BLD: 0 /100 WBCS — SIGNIFICANT CHANGE UP (ref 0–0)
NRBC # FLD: 0 K/UL — SIGNIFICANT CHANGE UP (ref 0–0)
PLATELET # BLD AUTO: 234 K/UL — SIGNIFICANT CHANGE UP (ref 150–400)
POTASSIUM SERPL-MCNC: 4.2 MMOL/L — SIGNIFICANT CHANGE UP (ref 3.5–5.3)
POTASSIUM SERPL-SCNC: 4.2 MMOL/L — SIGNIFICANT CHANGE UP (ref 3.5–5.3)
PROT SERPL-MCNC: 8.3 G/DL — SIGNIFICANT CHANGE UP (ref 6–8.3)
RBC # BLD: 5.52 M/UL — SIGNIFICANT CHANGE UP (ref 4.2–5.8)
RBC # FLD: 12.5 % — SIGNIFICANT CHANGE UP (ref 10.3–14.5)
RSV RNA NPH QL NAA+NON-PROBE: SIGNIFICANT CHANGE UP
SARS-COV-2 RNA SPEC QL NAA+PROBE: SIGNIFICANT CHANGE UP
SODIUM SERPL-SCNC: 137 MMOL/L — SIGNIFICANT CHANGE UP (ref 135–145)
WBC # BLD: 8.7 K/UL — SIGNIFICANT CHANGE UP (ref 3.8–10.5)
WBC # FLD AUTO: 8.7 K/UL — SIGNIFICANT CHANGE UP (ref 3.8–10.5)

## 2024-06-12 PROCEDURE — 99285 EMERGENCY DEPT VISIT HI MDM: CPT

## 2024-06-12 PROCEDURE — 93010 ELECTROCARDIOGRAM REPORT: CPT

## 2024-06-12 PROCEDURE — 74177 CT ABD & PELVIS W/CONTRAST: CPT | Mod: 26,MC

## 2024-06-12 RX ORDER — SODIUM CHLORIDE 9 MG/ML
1000 INJECTION INTRAMUSCULAR; INTRAVENOUS; SUBCUTANEOUS ONCE
Refills: 0 | Status: COMPLETED | OUTPATIENT
Start: 2024-06-12 | End: 2024-06-12

## 2024-06-12 RX ADMIN — SODIUM CHLORIDE 1000 MILLILITER(S): 9 INJECTION INTRAMUSCULAR; INTRAVENOUS; SUBCUTANEOUS at 16:58

## 2024-06-12 RX ADMIN — Medication 30 MILLILITER(S): at 16:57

## 2024-06-12 NOTE — ED PROVIDER NOTE - NSICDXPASTMEDICALHX_GEN_ALL_CORE_FT
PAST MEDICAL HISTORY:  Diabetes     Diabetes     Elevated cholesterol     GERD (gastroesophageal reflux disease)     HTN (hypertension)     Hyperlipidemia     No pertinent past medical history

## 2024-06-12 NOTE — ED PROVIDER NOTE - PROGRESS NOTE DETAILS
ERICA Muñoz: Discussed with patient's labs are without emergent findings, discussed A1c of 9.1.  Patient offered CDU stay follow-up with endocrinology however states he will speak to his primary care doctor regarding diabetes management.  Discussed with patient CT is without acute findings in the abdomen or pelvis however there is prostate enlargement prefer to continue following up with his doctors for this and to keep his appointment with a gastroenterologist for an endoscopy.  Patient states he will call to get results for his flu/COVID test. Advised to take imodium if diarrhea re-occurs but has not had diarrhea since being int he ER.  Discussed strict return precautions and prompt follow-up.  Patient verbalized an understanding and agrees with the plan

## 2024-06-12 NOTE — ED PROVIDER NOTE - PATIENT PORTAL LINK FT
You can access the FollowMyHealth Patient Portal offered by [facility] by registering at the following website: [email or web address]. By joining [facility]’s FollowMyHealth portal, you will also be able to view your health information using other applications (apps) compatible with our system.

## 2024-06-12 NOTE — ED PROVIDER NOTE - NSFOLLOWUPINSTRUCTIONS_ED_ALL_ED_FT
**PLEASE SPEAK TO YOUR PRIMARY CARE DOCTOR REQUIRING YOUR A1C OF 9.1, YOUR DIABETES MANAGEMENT MAY NEED TO BE CHANGED. **    Diarrhea, Adult  Diarrhea is when you pass loose and sometimes watery poop (stool) often. Diarrhea can make you feel weak and cause you to lose water in your body (get dehydrated). Losing water in your body can cause you to:  Feel tired and thirsty.  Have a dry mouth.  Go pee (urinate) less often.  Diarrhea often lasts 2–3 days. It can last longer if it is a sign of something more serious. Be sure to treat your diarrhea as told by your doctor.    Follow these instructions at home:  Eating and drinking    A bottle of clear juice and a glass of water.  Bread, rice, and cereal from the grain group.   Follow these instructions as told by your doctor:  Take an ORS (oral rehydration solution). This is a drink that helps you replace fluids and minerals your body lost. It is sold at pharmacies and stores.  Drink enough fluid to keep your pee (urine) pale yellow.  Drink fluids such as:  Water. You can also get fluids by sucking on ice chips.  Diluted fruit juice.  Low-calorie sports drinks.  Milk.  Avoid drinking fluids that have a lot of sugar or caffeine in them. These include soda, energy drinks, and regular sports drinks.  Avoid alcohol.  Eat bland, easy-to-digest foods in small amounts as you are able. These foods include:  Bananas.  Applesauce.  Rice.  Low-fat (lean) meats.  Toast.  Crackers.  Avoid spicy or fatty foods.  Medicines    Take over-the-counter and prescription medicines only as told by your doctor.  If you were prescribed antibiotics, take them as told by your doctor. Do not stop taking them even if you start to feel better.  General instructions    Washing hands with soap and water.  Wash your hands often using soap and water for 20 seconds. If soap and water are not available, use hand . Others in your home should wash their hands as well. Wash your hands:  After using the toilet or changing a diaper.  Before preparing, cooking, or serving food.  While caring for a sick person.  While visiting someone in a hospital.  Rest at home while you get better.  Take a warm bath to help with any burning or pain from having diarrhea.  Watch your condition for any changes.  Contact a doctor if:  You have a fever.  Your diarrhea gets worse.  You have new symptoms.  You vomit every time you eat or drink.  You feel light-headed, dizzy, or you have a headache.  You have muscle cramps.  You have signs of losing too much water in your body, such as:  Dark pee, very little pee, or no pee.  Cracked lips.  Dry mouth.  Sunken eyes.  Sleepiness.  Weakness.  You have bloody or black poop or poop that looks like tar.  You have very bad pain, cramping, or bloating in your belly (abdomen).  Your skin feels cold and clammy.  You feel confused.  Get help right away if:  You have chest pain.  Your heart is beating very quickly.  You have trouble breathing or you are breathing very quickly.  You feel very weak or you faint.  These symptoms may be an emergency. Get help right away. Call 911.  Do not wait to see if the symptoms will go away.  Do not drive yourself to the hospital.  This information is not intended to replace advice given to you by your health care provider. Make sure you discuss any questions you have with your health care provider.

## 2024-06-12 NOTE — ED PROVIDER NOTE - NSICDXPASTSURGICALHX_GEN_ALL_CORE_FT
PAST SURGICAL HISTORY:  H/O shoulder surgery     No significant past surgical history     No significant past surgical history

## 2024-06-12 NOTE — ED ADULT TRIAGE NOTE - CHIEF COMPLAINT QUOTE
Patient complains of nausea, vomiting, diarrhea for the past 3 days. Patient also complains of headache x months. Patient also complains of decreased appetite for the past 3 days. Patient denies any CP or SOB, respirations equal and unlabored on room air. Fingerstick: 150 pmhx: DM

## 2024-06-12 NOTE — ED PROVIDER NOTE - CLINICAL SUMMARY MEDICAL DECISION MAKING FREE TEXT BOX
69 y/o M with T2DM ( not on insulin,) HLD, HTN, GERD p/w 3 days of diarrhea and abdominal cramping.    Abdominal exam without peritoneal signs. No evidence of acute abdomen at this time. Well appearing. Low suspicion for acute hepatobiliary disease (including acute cholecystitis), acute pancreatitis, PUD (including perforation), acute infectious processes (pneumonia, hepatitis, pyelonephritis), acute appendicitis, vascular catastrophe, bowel obstruction or viscus perforation. Presentation not consistent with other acute, emergent causes of abdominal pain at this time.    Plan: labs, EKG, CT AP, pain control, serial reassessment

## 2024-06-12 NOTE — ED ADULT NURSE NOTE - OBJECTIVE STATEMENT
Pt received c/o abdominal pain N/V/D x 3 days. Denies fever/chills. Denies chest pain/SOB. Respirations even and unlabored. Abdomen distended. 20g IV placed in L AC, labs drawn as ordered. Pt awaiting ct scan.

## 2024-06-12 NOTE — ED ADULT NURSE NOTE - NSFALLUNIVINTERV_ED_ALL_ED
Bed/Stretcher in lowest position, wheels locked, appropriate side rails in place/Call bell, personal items and telephone in reach/Instruct patient to call for assistance before getting out of bed/chair/stretcher/Non-slip footwear applied when patient is off stretcher/[place] to call system/Physically safe environment - no spills, clutter or unnecessary equipment/Purposeful proactive rounding/Room/bathroom lighting operational, light cord in reach

## 2024-06-12 NOTE — ED PROVIDER NOTE - OBJECTIVE STATEMENT
69 y/o M with T2DM ( not on insulin,) HLD, HTN, GERD p/w 3 days of diarrhea and abdominal cramping.    Patient states BM have been liquidy light brown in color. Tolerating PO intake. No emesis. No recent travel/ill contacts use. No changes to home medications. Has scheduled Endoscopy this upcoming week. Had recent colonoscopy with "normal" results within the past week. Passing gas.     No prior AP surgeries. Mother with hx of GI cancer.    Denies melena, hematochezia, blood on tissue when wiping, rectal pain/bleeding, fevers, chills, cough, chest pain, shortness of breath, palpitations, vomiting, testicular pain/swelling, dysuria, hematuria, penile discharge, rash, leg swelling

## 2024-06-20 ENCOUNTER — RESULT REVIEW (OUTPATIENT)
Age: 69
End: 2024-06-20

## 2024-06-20 ENCOUNTER — APPOINTMENT (OUTPATIENT)
Dept: GASTROENTEROLOGY | Facility: HOSPITAL | Age: 69
End: 2024-06-20

## 2024-06-20 ENCOUNTER — TRANSCRIPTION ENCOUNTER (OUTPATIENT)
Age: 69
End: 2024-06-20

## 2024-06-20 ENCOUNTER — OUTPATIENT (OUTPATIENT)
Dept: OUTPATIENT SERVICES | Facility: HOSPITAL | Age: 69
LOS: 1 days | End: 2024-06-20
Payer: COMMERCIAL

## 2024-06-20 VITALS
SYSTOLIC BLOOD PRESSURE: 112 MMHG | DIASTOLIC BLOOD PRESSURE: 77 MMHG | WEIGHT: 190.04 LBS | HEART RATE: 71 BPM | HEIGHT: 66 IN | TEMPERATURE: 97 F | OXYGEN SATURATION: 99 % | RESPIRATION RATE: 17 BRPM

## 2024-06-20 VITALS — DIASTOLIC BLOOD PRESSURE: 82 MMHG | SYSTOLIC BLOOD PRESSURE: 109 MMHG | RESPIRATION RATE: 24 BRPM | HEART RATE: 96 BPM

## 2024-06-20 DIAGNOSIS — Z98.890 OTHER SPECIFIED POSTPROCEDURAL STATES: Chronic | ICD-10-CM

## 2024-06-20 DIAGNOSIS — K21.9 GASTRO-ESOPHAGEAL REFLUX DISEASE WITHOUT ESOPHAGITIS: ICD-10-CM

## 2024-06-20 LAB
ANION GAP SERPL CALC-SCNC: 4 MMOL/L — LOW (ref 5–17)
BASE EXCESS BLDV CALC-SCNC: -1.8 MMOL/L — SIGNIFICANT CHANGE UP
BUN SERPL-MCNC: 12 MG/DL — SIGNIFICANT CHANGE UP (ref 7–18)
CA-I SERPL-SCNC: SIGNIFICANT CHANGE UP MMOL/L (ref 1.15–1.33)
CALCIUM SERPL-MCNC: 8.4 MG/DL — SIGNIFICANT CHANGE UP (ref 8.4–10.5)
CHLORIDE SERPL-SCNC: 108 MMOL/L — SIGNIFICANT CHANGE UP (ref 96–108)
CO2 SERPL-SCNC: 22 MMOL/L — SIGNIFICANT CHANGE UP (ref 22–31)
CREAT SERPL-MCNC: 0.7 MG/DL — SIGNIFICANT CHANGE UP (ref 0.5–1.3)
EGFR: 101 ML/MIN/1.73M2 — SIGNIFICANT CHANGE UP
GAS PNL BLDV: 131 MMOL/L — LOW (ref 136–145)
GAS PNL BLDV: SIGNIFICANT CHANGE UP
GLUCOSE SERPL-MCNC: 182 MG/DL — HIGH (ref 70–99)
HCO3 BLDV-SCNC: 23 MMOL/L — SIGNIFICANT CHANGE UP (ref 22–29)
LACTATE BLDV-MCNC: 1.6 MMOL/L — SIGNIFICANT CHANGE UP (ref 0.5–2)
PCO2 BLDV: 39 MMHG — LOW (ref 42–55)
PH BLDV: 7.38 — SIGNIFICANT CHANGE UP (ref 7.32–7.43)
PO2 BLDV: 58 MMHG — SIGNIFICANT CHANGE UP
POTASSIUM BLDV-SCNC: 4.2 MMOL/L — SIGNIFICANT CHANGE UP (ref 3.5–5.1)
POTASSIUM SERPL-MCNC: 4 MMOL/L — SIGNIFICANT CHANGE UP (ref 3.5–5.3)
POTASSIUM SERPL-SCNC: 4 MMOL/L — SIGNIFICANT CHANGE UP (ref 3.5–5.3)
SAO2 % BLDV: 91.2 % — SIGNIFICANT CHANGE UP
SODIUM SERPL-SCNC: 134 MMOL/L — LOW (ref 135–145)

## 2024-06-20 PROCEDURE — 80048 BASIC METABOLIC PNL TOTAL CA: CPT

## 2024-06-20 PROCEDURE — 82803 BLOOD GASES ANY COMBINATION: CPT

## 2024-06-20 PROCEDURE — 84132 ASSAY OF SERUM POTASSIUM: CPT

## 2024-06-20 PROCEDURE — 88312 SPECIAL STAINS GROUP 1: CPT | Mod: 26

## 2024-06-20 PROCEDURE — 43239 EGD BIOPSY SINGLE/MULTIPLE: CPT

## 2024-06-20 PROCEDURE — 83605 ASSAY OF LACTIC ACID: CPT

## 2024-06-20 PROCEDURE — 36415 COLL VENOUS BLD VENIPUNCTURE: CPT

## 2024-06-20 PROCEDURE — 88305 TISSUE EXAM BY PATHOLOGIST: CPT

## 2024-06-20 PROCEDURE — 84295 ASSAY OF SERUM SODIUM: CPT

## 2024-06-20 PROCEDURE — 82330 ASSAY OF CALCIUM: CPT

## 2024-06-20 PROCEDURE — 88312 SPECIAL STAINS GROUP 1: CPT

## 2024-06-20 PROCEDURE — 88305 TISSUE EXAM BY PATHOLOGIST: CPT | Mod: 26

## 2024-06-20 DEVICE — ESOPHAGEAL BALLOON CATH CRE FIXED WIRE 10-11-12MM: Type: IMPLANTABLE DEVICE | Status: FUNCTIONAL

## 2024-06-20 DEVICE — ESOPHAGEAL BALLOON CATH CRE FIXED WIRE 6-7-8MM: Type: IMPLANTABLE DEVICE | Status: FUNCTIONAL

## 2024-06-20 DEVICE — ESOPHAGEAL BALLOON CATH CRE FIXED WIRE 8-9-10MM: Type: IMPLANTABLE DEVICE | Status: FUNCTIONAL

## 2024-06-20 RX ORDER — SODIUM CHLORIDE 0.9 % (FLUSH) 0.9 %
500 SYRINGE (ML) INJECTION
Refills: 0 | Status: COMPLETED | OUTPATIENT
Start: 2024-06-20 | End: 2024-06-20

## 2024-06-20 RX ORDER — DEXTROSE MONOHYDRATE AND SODIUM CHLORIDE 5; .3 G/100ML; G/100ML
1000 INJECTION, SOLUTION INTRAVENOUS
Refills: 0 | Status: DISCONTINUED | OUTPATIENT
Start: 2024-06-20 | End: 2024-07-04

## 2024-06-20 RX ADMIN — Medication 30 MILLILITER(S): at 11:17

## 2024-06-24 LAB — SURGICAL PATHOLOGY STUDY: SIGNIFICANT CHANGE UP

## 2024-07-22 ENCOUNTER — APPOINTMENT (OUTPATIENT)
Dept: GERIATRICS | Facility: CLINIC | Age: 69
End: 2024-07-22
Payer: MEDICARE

## 2024-07-22 VITALS
HEART RATE: 78 BPM | WEIGHT: 177.38 LBS | BODY MASS INDEX: 29.55 KG/M2 | OXYGEN SATURATION: 98 % | DIASTOLIC BLOOD PRESSURE: 75 MMHG | TEMPERATURE: 97.3 F | HEIGHT: 65 IN | SYSTOLIC BLOOD PRESSURE: 111 MMHG

## 2024-07-22 DIAGNOSIS — I15.2 TYPE 2 DIABETES MELLITUS WITH OTHER CIRCULATORY COMPLICATIONS: ICD-10-CM

## 2024-07-22 DIAGNOSIS — Z13.6 ENCOUNTER FOR SCREENING FOR CARDIOVASCULAR DISORDERS: ICD-10-CM

## 2024-07-22 DIAGNOSIS — K21.9 GASTRO-ESOPHAGEAL REFLUX DISEASE W/OUT ESOPHAGITIS: ICD-10-CM

## 2024-07-22 DIAGNOSIS — E78.5 HYPERLIPIDEMIA, UNSPECIFIED: ICD-10-CM

## 2024-07-22 DIAGNOSIS — E78.5 TYPE 2 DIABETES MELLITUS WITH OTHER SPECIFIED COMPLICATION: ICD-10-CM

## 2024-07-22 DIAGNOSIS — E03.9 HYPOTHYROIDISM, UNSPECIFIED: ICD-10-CM

## 2024-07-22 DIAGNOSIS — G31.84 MILD COGNITIVE IMPAIRMENT, SO STATED: ICD-10-CM

## 2024-07-22 DIAGNOSIS — E11.59 TYPE 2 DIABETES MELLITUS WITH OTHER CIRCULATORY COMPLICATIONS: ICD-10-CM

## 2024-07-22 DIAGNOSIS — E11.9 TYPE 2 DIABETES MELLITUS W/OUT COMPLICATIONS: ICD-10-CM

## 2024-07-22 DIAGNOSIS — D64.9 ANEMIA, UNSPECIFIED: ICD-10-CM

## 2024-07-22 DIAGNOSIS — E11.69 TYPE 2 DIABETES MELLITUS WITH OTHER SPECIFIED COMPLICATION: ICD-10-CM

## 2024-07-22 DIAGNOSIS — C61 MALIGNANT NEOPLASM OF PROSTATE: ICD-10-CM

## 2024-07-22 PROCEDURE — 99214 OFFICE O/P EST MOD 30 MIN: CPT

## 2024-07-22 PROCEDURE — G2211 COMPLEX E/M VISIT ADD ON: CPT

## 2024-07-22 RX ORDER — PAPAV/PHENTOLAM/ALPROST/WATER 12-1-10/ML
12-1-0.01 VIAL (ML) INTRACAVERNOSAL
Refills: 0 | Status: ACTIVE | COMMUNITY

## 2024-07-22 RX ORDER — ORAL SEMAGLUTIDE 14 MG/1
14 TABLET ORAL
Qty: 90 | Refills: 1 | Status: ACTIVE | COMMUNITY
Start: 2024-07-22

## 2024-07-22 RX ORDER — DAPAGLIFLOZIN AND METFORMIN HYDROCHLORIDE 5; 1000 MG/1; MG/1
5-1000 TABLET, FILM COATED, EXTENDED RELEASE ORAL
Qty: 90 | Refills: 1 | Status: ACTIVE | COMMUNITY
Start: 2024-07-22

## 2024-07-22 NOTE — ASSESSMENT
[FreeTextEntry1] : Overall doing okay. Diabetic meds reconciled, has been exercising and dieting more. Atorvastatin and levothyroxine increased by specialists. GERD still bad, advised daily omeprazole and as needed gaviscon, following with GI, s/p EGD. Going to [facility] in September and returning in November.  RTC in 6 months for an annual physical exam.  Total time spent: 30 mins This includes chart review, patient assessment, discussion and collaboration with interdisciplinary team members, excluding time spent on separately billable services.

## 2024-07-22 NOTE — HISTORY OF PRESENT ILLNESS
[No falls in past year] : Patient reported no falls in the past year [Patient is independent with] : bathing [Completely Independent] : Completely independent. [] : managing medications [Independent] : managing finances [FAST Score: ____] : Functional Assessment Scale (FAST) Score: [unfilled] [Smoke Detector] : smoke detector [Carbon Monoxide Detector] : carbon monoxide detector [Night Light] : night light [Wears Seat Belt] : wears seat belt [Wears Sunscreen] : wears sunscreen [NO] : No [Little interest or pleasure doing things] : 1) Little interest or pleasure doing things [Feeling down, depressed, or hopeless] : 2) Feeling down, depressed, or hopeless [0] : 2) Feeling down, depressed, or hopeless: Not at all (0) [PHQ-2 Negative - No further assessment needed] : PHQ-2 Negative - No further assessment needed [FreeTextEntry1] : 69 y/o M with PMHx of prostate cancer/BPH, HTN, HLD, GERD/bloating ED, colonic polyps, MACHELLE on CPAP, DM presents to the practice for a follow up visit.  GERD: S/p EGD, follow up on 07/29/24. He has recurrent sx. Does not consume dairy products, avoids EtOH, eats whole wheat breads and pastas w/o issues. Reflux at different times both with food and w/o food. Takes omeprazole 40 mg PRN only. No other meds on board.  Colonic polyps: Recent colonoscopy by Dr. Winter Chau (not in our system). in August 2021. has diverticulosis in sigmoid, descending, transverse, ascending, internal hemorrhoids NB, normal mucosa of colon/ileum. Following with GI.  MACHELLE: Supposed to use CPAP but non-compliant.  SNHL: Diagnosed from last ENT visit via hearing test, cleared to get hearing aids.  Prostate cancer: Surveillance with urology. PIRADS2, pending repeat PSA level, no issues noted today.  ED: Following with urology. On Trimix, tolerating well, needs refill, will call urology.  MACHELLE/WTC disaster related injuries: Persistent MACHELLE, prostate cancer following with urology, chronic sinusitis following with ENT, overall stable on meds.   HTN: BP okay today. On ramipril 5 mg daily. No chest pain, palpitations, SOB.  DM: On Xigduo XR 5 mg daily, Rybelsus 14 mg daily, off farxiga/ozempic. Cannot tolerate injections. Does not check sugars at home. A1c 7.7% previously. Has been exercising and dieting recently.  HLD: Atorvastatin 80 mg QHS, recently increased by cards due to abnormal cath? Dr. Shreyas Adams in [place] is his cardiologist.  HCM: Needs AAA screening.  Former smoker: Quit greater than 25 years ago.  MCI: Patient has some memory issues, mostly forgetfulness, forgetting little details, word finding issues. Has not interfered wit his daily life function. I/ADL's are intact. There is no family history of dementia. He has not history of CVA but does have CVD for which he is on medications. C/b alcohol use. No mood issues. No hallucinations/delusions. MOCA: 19/30, GDS: 4/15. Not on medications. [Stair Lift] : no stair lift used in home [Grab Bars] : no grab bars [Shower Chair] : no shower chair [Anti-Slip Measures] : no anti-slip measures [Driving Concerns] : not driving or driving without noted concerns [[id number]] : 0

## 2024-07-22 NOTE — REVIEW OF SYSTEMS
[As Noted in HPI] : as noted in HPI [Negative] : Heme/Lymph Spiral Flap Text: The defect edges were debeveled with a #15 scalpel blade.  Given the location of the defect, shape of the defect and the proximity to free margins a spiral flap was deemed most appropriate.  Using a sterile surgical marker, an appropriate rotation flap was drawn incorporating the defect and placing the expected incisions within the relaxed skin tension lines where possible. The area thus outlined was incised deep to adipose tissue with a #15 scalpel blade.  The skin margins were undermined to an appropriate distance in all directions utilizing iris scissors.

## 2024-07-23 ENCOUNTER — NON-APPOINTMENT (OUTPATIENT)
Age: 69
End: 2024-07-23

## 2024-07-23 LAB
ALBUMIN SERPL ELPH-MCNC: 4.9 G/DL
ALP BLD-CCNC: 73 U/L
ALT SERPL-CCNC: 53 U/L
ANION GAP SERPL CALC-SCNC: 15 MMOL/L
AST SERPL-CCNC: 25 U/L
BASOPHILS # BLD AUTO: 0.02 K/UL
BASOPHILS NFR BLD AUTO: 0.2 %
BILIRUB SERPL-MCNC: 0.4 MG/DL
BUN SERPL-MCNC: 13 MG/DL
CALCIUM SERPL-MCNC: 9.2 MG/DL
CHLORIDE SERPL-SCNC: 101 MMOL/L
CHOLEST SERPL-MCNC: 113 MG/DL
CO2 SERPL-SCNC: 21 MMOL/L
CREAT SERPL-MCNC: 0.61 MG/DL
EGFR: 105 ML/MIN/1.73M2
EOSINOPHIL # BLD AUTO: 0.09 K/UL
EOSINOPHIL NFR BLD AUTO: 1.1 %
ESTIMATED AVERAGE GLUCOSE: 180 MG/DL
GLUCOSE SERPL-MCNC: 128 MG/DL
HBA1C MFR BLD HPLC: 7.9 %
HCT VFR BLD CALC: 48.3 %
HDLC SERPL-MCNC: 38 MG/DL
HGB BLD-MCNC: 16.3 G/DL
IMM GRANULOCYTES NFR BLD AUTO: 0.4 %
LDLC SERPL CALC-MCNC: 60 MG/DL
LYMPHOCYTES # BLD AUTO: 1.88 K/UL
LYMPHOCYTES NFR BLD AUTO: 22.9 %
MAN DIFF?: NORMAL
MCHC RBC-ENTMCNC: 30.5 PG
MCHC RBC-ENTMCNC: 33.7 GM/DL
MCV RBC AUTO: 90.3 FL
MONOCYTES # BLD AUTO: 0.45 K/UL
MONOCYTES NFR BLD AUTO: 5.5 %
NEUTROPHILS # BLD AUTO: 5.74 K/UL
NEUTROPHILS NFR BLD AUTO: 69.9 %
NONHDLC SERPL-MCNC: 75 MG/DL
PLATELET # BLD AUTO: 264 K/UL
POTASSIUM SERPL-SCNC: 4.3 MMOL/L
PROT SERPL-MCNC: 7.8 G/DL
RBC # BLD: 5.35 M/UL
RBC # FLD: 13.1 %
SODIUM SERPL-SCNC: 137 MMOL/L
TRIGL SERPL-MCNC: 70 MG/DL
TSH SERPL-ACNC: 0.64 UIU/ML
WBC # FLD AUTO: 8.21 K/UL

## 2024-07-24 ENCOUNTER — APPOINTMENT (OUTPATIENT)
Dept: ULTRASOUND IMAGING | Facility: CLINIC | Age: 69
End: 2024-07-24

## 2024-07-24 PROCEDURE — 76775 US EXAM ABDO BACK WALL LIM: CPT

## 2024-07-29 ENCOUNTER — APPOINTMENT (OUTPATIENT)
Dept: GASTROENTEROLOGY | Facility: CLINIC | Age: 69
End: 2024-07-29
Payer: COMMERCIAL

## 2024-07-29 VITALS
WEIGHT: 173 LBS | SYSTOLIC BLOOD PRESSURE: 97 MMHG | OXYGEN SATURATION: 98 % | TEMPERATURE: 97.4 F | BODY MASS INDEX: 28.82 KG/M2 | HEART RATE: 81 BPM | DIASTOLIC BLOOD PRESSURE: 67 MMHG | HEIGHT: 65 IN

## 2024-07-29 DIAGNOSIS — E11.43 TYPE 2 DIABETES MELLITUS WITH DIABETIC AUTONOMIC (POLY)NEUROPATHY: ICD-10-CM

## 2024-07-29 DIAGNOSIS — K29.30 CHRONIC SUPERFICIAL GASTRITIS W/OUT BLEEDING: ICD-10-CM

## 2024-07-29 DIAGNOSIS — K31.84 TYPE 2 DIABETES MELLITUS WITH DIABETIC AUTONOMIC (POLY)NEUROPATHY: ICD-10-CM

## 2024-07-29 DIAGNOSIS — K31.A0 GASTRIC INTESTINAL METAPLASIA, UNSPECIFIED: ICD-10-CM

## 2024-07-29 DIAGNOSIS — R19.7 DIARRHEA, UNSPECIFIED: ICD-10-CM

## 2024-07-29 DIAGNOSIS — R10.13 EPIGASTRIC PAIN: ICD-10-CM

## 2024-07-29 DIAGNOSIS — Z86.010 PERSONAL HISTORY OF COLONIC POLYPS: ICD-10-CM

## 2024-07-29 PROCEDURE — 99214 OFFICE O/P EST MOD 30 MIN: CPT

## 2024-07-29 RX ORDER — SIMETHICONE 125 MG/1
125 TABLET, CHEWABLE ORAL
Qty: 120 | Refills: 2 | Status: ACTIVE | COMMUNITY
Start: 2024-07-29 | End: 1900-01-01

## 2024-07-29 RX ORDER — ALUMINUM HYDROXIDE AND MAGNESIUM CARBONATE 160; 105 MG/1; MG/1
160-105 TABLET, CHEWABLE ORAL
Qty: 30 | Refills: 0 | Status: ACTIVE | COMMUNITY
Start: 2024-07-22

## 2024-07-29 NOTE — ASSESSMENT
[FreeTextEntry1] : Dyspepsia: The patient complains of dyspeptic symptoms.  The patient was advised to continue to abide by an anti-gas (low FOD-MAP) diet.  The patient was previously given a pamphlet for anti-gas (low FOD-MAP).  The patient and I reviewed the anti-gas (low FOD-MAP) diet at length again. The patient is to continue on a trial of Simethicone one tablet 4 times a day p.r.n. abdominal pain and gas. Diarrhea: The patient complains of diarrhea.  I recommend a low residue diet. The patient is to avoid fiber supplementation. The patient is to consider starting a trial of a probiotic such as Align once a day.   If the symptoms persist, the patient may requires ending stool studies for C+S, O+P x3, and C. difficile to assess for an infectious etiology of the diarrhea.   If the symptoms persist, the patient may require a colonoscopy to assess for colitis versus other causes.  The patient was told of the risks and benefits of the procedure.  The patient was told of the risks of perforation, emergency surgery, bleeding, infections and missed lesions.  The patient agreed and will follow-up to reassess the symptoms.   Weight Loss:  The patient complains of weight loss but denies any anorexia.  The patient admits to losing 14 pounds over the past 1 1/2 months. The patient attributes the weight loss to recent change in diet and exercise.    Prior Endoscopic Procedures: The patient had a prior colonoscopy performed by another gastroenterologist, Dr. Chau. I reviewed the prior colonoscopy reports. The patient signed a record release to obtain those records. Gastritis: The patient has a history of gastritis noted on prior upper endoscopy. The patient is to avoid nonsteroidal anti-inflammatory drugs and aspirin. I recommend a trial of pantoprazole 40 mg once a day for 3 months for the symptoms.  Gastroparesis: The patient has a long history of diabetes mellitus.   The patient has symptoms suggestive of diabetic gastroparesis.  The patient may require a trial of a promotility agent for the symptoms pending the upper endoscopy results.  I recommend a trial of Reglan (metoclopramide) 5 mg 4 times a day for 3 months.  The patient and I had a long discussion regarding the risks of potential side effects of Reglan (metoclopramide).  The patient was told of the risk of headaches, dizziness, diarrhea and permanent tremors.  The patient was told to stop the medication immediately and call the office if any of these symptoms occur.  The patient agrees. Intestinal Metaplasia of the Stomach:  The patient was found to have intestinal metaplasia of the stomach on prior upper endoscopy.  The findings of intestinal metaplasia of the stomach have an increased risk of gastric cancer.  The biopsies did not reveal dysplasia.  I recommend a repeat upper endoscopy with mapping in 3 years to reassess for intestinal metaplasia and dysplasia unless symptomatic.  The patient is aware of the increased risk of intestinal metaplasia and progression to stomach cancer.  The patient agrees to follow-up. Diverticulosis: I recommend a high-fiber diet and avoid seeds. The patient is to consider a trial of Metamucil once a day for fiber supplementation. The patient is to also consider a trial of a probiotic such as Align once a day. The patient and I discussed the potential risk of diverticulitis and diverticular bleeding secondary to diverticular disease. The patient is aware of the importance of follow-up if these complications arise. Internal Hemorrhoids: The patient is to consider a trial of Anusol H. C. suppositories one per rectum nightly and Anusol HC2 .5% cream apply to affected area twice a day p.r.n. hemorrhoidal bleeding or pain. I also recommend a trial of Calmoseptine cream apply to affected area twice a day for hemorrhoidal discomfort.  I recommend Tucks pads for the hemorrhoids.  I recommend Sitz baths twice a day for the hemorrhoids.  I recommend avoid wearing tight underwear and use boxers.  I recommend avoid touching the perineal area.   The patient agreed to followup.  History of Colonic Polyps: The patient has a history of colonic polyps.  I recommend a repeat colonoscopy in 2 years to reassess for colonic polyps unless symptomatic.  The patient agreed and will follow up for the procedure.  Follow-up: The patient is to follow-up in the office in 6 months to reassess the symptoms. The patient was told to call the office if any further problems.

## 2024-07-29 NOTE — HISTORY OF PRESENT ILLNESS
[de-identified] : The upper endoscopy performed at the [facility] endoscopy suite on June 20, 2024 revealed mild diffuse bile gastritis with retained food in the body of the stomach suggestive of gastroparesis. The gastric pathology performed on June 20, 2024 revealed esophageal mucosa with unremarkable squamous mucosa (esophagus), gastric antral and body mucosa with chronic mild gastritis with intestinal (goblet cell) metaplasia that was negative for Helicobacter pylori (antrum and body of the stomach) and duodenal mucosa with small intestinal mucosa without significant pathology (duodenum).   [FreeTextEntry1] : The colonoscopy performed by another gastroenterologist, Dr. Winter Chau on August 13, 2021 revealed diverticulosis in the sigmoid colon, descending colon, transverse colon and ascending colon, internal hemorrhoids and normal mucosa of the entire examined colon and normal examined portion of the ileum. The colonic pathology performed on August 13, 2021 revealed colonic mucosa with focal fresh hemorrhage with negative for inflammation, microscopic colitis, granulomas or dysplasia (right colon) and colonic mucosa with focal fresh hemorrhage and lymphocyte aggregates that was negative for inflammation, microscopic colitis, granulomas or dysplasia.

## 2024-07-31 ENCOUNTER — RX RENEWAL (OUTPATIENT)
Age: 69
End: 2024-07-31

## 2024-08-21 ENCOUNTER — APPOINTMENT (OUTPATIENT)
Dept: RADIOLOGY | Facility: CLINIC | Age: 69
End: 2024-08-21

## 2024-08-21 ENCOUNTER — APPOINTMENT (OUTPATIENT)
Dept: OTHER | Facility: CLINIC | Age: 69
End: 2024-08-21
Payer: COMMERCIAL

## 2024-08-21 VITALS
HEIGHT: 65 IN | OXYGEN SATURATION: 98 % | DIASTOLIC BLOOD PRESSURE: 68 MMHG | HEART RATE: 82 BPM | SYSTOLIC BLOOD PRESSURE: 102 MMHG | BODY MASS INDEX: 28.49 KG/M2 | WEIGHT: 171 LBS | TEMPERATURE: 98.2 F

## 2024-08-21 DIAGNOSIS — G47.33 OBSTRUCTIVE SLEEP APNEA (ADULT) (PEDIATRIC): ICD-10-CM

## 2024-08-21 DIAGNOSIS — C61 MALIGNANT NEOPLASM OF PROSTATE: ICD-10-CM

## 2024-08-21 DIAGNOSIS — Z04.9 ENCOUNTER FOR EXAMINATION AND OBSERVATION FOR UNSPECIFIED REASON: ICD-10-CM

## 2024-08-21 DIAGNOSIS — J32.9 CHRONIC SINUSITIS, UNSPECIFIED: ICD-10-CM

## 2024-08-21 PROCEDURE — 94010 BREATHING CAPACITY TEST: CPT

## 2024-08-21 PROCEDURE — 71046 X-RAY EXAM CHEST 2 VIEWS: CPT

## 2024-08-21 PROCEDURE — 99397 PER PM REEVAL EST PAT 65+ YR: CPT | Mod: 25

## 2024-08-21 PROCEDURE — 99214 OFFICE O/P EST MOD 30 MIN: CPT | Mod: 25

## 2024-08-21 RX ORDER — SOD CHLOR,BICARB/SQUEEZ BOTTLE
PACKET, WITH RINSE DEVICE NASAL
Qty: 3 | Refills: 1 | Status: ACTIVE | COMMUNITY
Start: 2024-08-21 | End: 1900-01-01

## 2024-08-21 RX ORDER — FEXOFENADINE HCL 60 MG/1
60 TABLET, FILM COATED ORAL TWICE DAILY
Qty: 75 | Refills: 0 | Status: ACTIVE | COMMUNITY
Start: 2024-08-21 | End: 1900-01-01

## 2024-08-21 RX ORDER — TADALAFIL 20 MG/1
20 TABLET ORAL
Qty: 30 | Refills: 1 | Status: ACTIVE | COMMUNITY
Start: 2024-08-21 | End: 1900-01-01

## 2024-08-21 RX ORDER — ELASTIC BANDAGE 1"X2.2YD
2300-700 BANDAGE TOPICAL
Qty: 1 | Refills: 0 | Status: ACTIVE | COMMUNITY
Start: 2024-08-21 | End: 1900-01-01

## 2024-08-21 NOTE — PLAN
[FreeTextEntry1] : continue as per ent for nose, continue as per allergy md. continue follow up for prostate ca. omeprazole given as part of the management for his chronic sinusitis. patient has stopped working as per my recommendation. he is totally disabled. rtc in 4 mo. will increase tadalafil as per pt's request. reorder cpap equipment. meds to mail order. i discussed with pt on getting  a chest ct given persistent restriction. he explained that he had a coronary ct by his pmd recenlty. will try to get copies of the result. cxr today.  [FreeTextEntry2] : permanently disabled [FreeTextEntry3] : guarded [FreeTextEntry4] : 4 mo.

## 2024-08-21 NOTE — DISCUSSION/SUMMARY
[Patient seen for WTC Monitoring ___] : Patient was seen for WTC monitoring [unfilled] [Please See Note in Chart and Documentation in Trial DB] : Please see note in chart and documentation in Trial DB. [FreeTextEntry3] : Symptoms and relevant review of symptoms: certified for sinusitis, della and prostate ca s. patient has been overall stable from his sinus condition. has been using saline which is helping even more than steroid. still has not received cpap machine. continues on f/u for his prostate ca. patient has stopped working as per my recommendation. pt has dm. Physical Exam     Constitutional: alert, in no acute distress, well nourished and well developed . pt has lost weignt. Eyes: the sclera and conjunctiva were normal, pupils were equal in size, round, and reactive to light and extraocular movements were intact. ENT: the ears and nose were normal in appearance. No tender at sinuses. oropharynx: clear. Neck: the appearance of the neck was normal, the neck was supple, no neck mass was observed and the thyroid was not enlarged . there was no jugular-venous distention. scar left healed form old injury. Pulmonary: no respiratory distress, normal respiratory rhythm and effort, no accessory muscle use, lungs were clear to auscultation bilaterally and palpation of the chest revealed no abnormalities. Heart: the apical impulse was normal, heart rate was normal and rhythm regular, normal S1 and S2, no gallops and no murmurs. Vascular:. there was no peripheral edema. Abdomen: normal bowel sounds, soft, non-tender, no hepato-splenomegaly and no abdominal mass palpated . umbilical hernia non tender. Lymphatics: The posterior cervical, anterior cervical, supraclavicular and axillary nodes were non-tender and normal size.  a and p[. docum,entation complleted.

## 2024-08-21 NOTE — HEALTH RISK ASSESSMENT
[Patient reported colonoscopy was abnormal] : Patient reported colonoscopy was abnormal [ColonoscopyDate] : 6/2021 [ColonoscopyComments] : dovertocula

## 2024-08-21 NOTE — REASON FOR VISIT
[Follow-Up] : a [unfilled] follow-up visit  [FreeTextEntry2] : 9/13/2001 [FreeTextEntry1] : visit conducted in Russian

## 2024-08-21 NOTE — PHYSICAL EXAM
[General Appearance - Alert] : alert [General Appearance - In No Acute Distress] : in no acute distress [General Appearance - Well Nourished] : well nourished [General Appearance - Well Developed] : well developed [Sclera] : the sclera and conjunctiva were normal [PERRL With Normal Accommodation] : pupils were equal in size, round, and reactive to light [Extraocular Movements] : extraocular movements were intact [Outer Ear] : the ears and nose were normal in appearance [Neck Appearance] : the appearance of the neck was normal [Neck Cervical Mass (___cm)] : no neck mass was observed [Jugular Venous Distention Increased] : there was no jugular-venous distention [Thyroid Diffuse Enlargement] : the thyroid was not enlarged [Exaggerated Use Of Accessory Muscles For Inspiration] : no accessory muscle use [Respiration, Rhythm And Depth] : normal respiratory rhythm and effort [Auscultation Breath Sounds / Voice Sounds] : lungs were clear to auscultation bilaterally [Chest Palpation] : palpation of the chest revealed no abnormalities [Apical Impulse] : the apical impulse was normal [Heart Rate And Rhythm] : heart rate was normal and rhythm regular [Heart Sounds] : normal S1 and S2 [Heart Sounds Gallop] : no gallops [Murmurs] : no murmurs [Edema] : there was no peripheral edema [Bowel Sounds] : normal bowel sounds [Abdomen Soft] : soft [Abdomen Tenderness] : non-tender [] : no hepato-splenomegaly [Abdomen Mass (___ Cm)] : no abdominal mass palpated [FreeTextEntry1] : umbilical hernia non tender. [Cervical Lymph Nodes Enlarged Posterior Bilaterally] : posterior cervical [Cervical Lymph Nodes Enlarged Anterior Bilaterally] : anterior cervical [Supraclavicular Lymph Nodes Enlarged Bilaterally] : supraclavicular [Axillary Lymph Nodes Enlarged Bilaterally] : axillary

## 2024-08-21 NOTE — HISTORY OF PRESENT ILLNESS
[Has the patient missed work because of the injury/illness?] : The patient has missed work because of the injury/illness. [No] : The patient is currently not working. [FreeTextEntry1] : certified for sinusitis, della and prostate ca s. patient has been overall stable from his sinus condition. has been using saline which is helping even more than steroid.  still has not received cpap machine. continues on f/u for his prostate ca.  patient has stopped working as per my recommendation.  pt has dm.  wc: case settled with medical.  [FreeTextEntry2] :  [FreeTextEntry3] : exposure to dusts and fumes, extremes of temperature and humidity [FreeTextEntry4] : nasal spray, antihistamine [FreeTextEntry5] : none [FreeTextEntry6] : july 2022

## 2024-08-21 NOTE — PHYSICAL EXAM
[General Appearance - Alert] : alert [General Appearance - In No Acute Distress] : in no acute distress [General Appearance - Well Nourished] : well nourished [General Appearance - Well Developed] : well developed [Sclera] : the sclera and conjunctiva were normal [PERRL With Normal Accommodation] : pupils were equal in size, round, and reactive to light [Extraocular Movements] : extraocular movements were intact [Outer Ear] : the ears and nose were normal in appearance [Neck Appearance] : the appearance of the neck was normal [Neck Cervical Mass (___cm)] : no neck mass was observed [Jugular Venous Distention Increased] : there was no jugular-venous distention [Thyroid Diffuse Enlargement] : the thyroid was not enlarged [Respiration, Rhythm And Depth] : normal respiratory rhythm and effort [Exaggerated Use Of Accessory Muscles For Inspiration] : no accessory muscle use [Auscultation Breath Sounds / Voice Sounds] : lungs were clear to auscultation bilaterally [Chest Palpation] : palpation of the chest revealed no abnormalities [Apical Impulse] : the apical impulse was normal [Heart Rate And Rhythm] : heart rate was normal and rhythm regular [Heart Sounds] : normal S1 and S2 [Heart Sounds Gallop] : no gallops [Murmurs] : no murmurs [Edema] : there was no peripheral edema [Bowel Sounds] : normal bowel sounds [Abdomen Soft] : soft [Abdomen Tenderness] : non-tender [] : no hepato-splenomegaly [Abdomen Mass (___ Cm)] : no abdominal mass palpated [FreeTextEntry1] : umbilical hernia non tender. [Cervical Lymph Nodes Enlarged Posterior Bilaterally] : posterior cervical [Cervical Lymph Nodes Enlarged Anterior Bilaterally] : anterior cervical [Supraclavicular Lymph Nodes Enlarged Bilaterally] : supraclavicular [Axillary Lymph Nodes Enlarged Bilaterally] : axillary

## 2024-08-21 NOTE — ASSESSMENT
[Indicate if, in your opinion, the incident that the patient described was the competent medical cause of this injury/illness.] : The incident that the patient described was the competent medical cause of this injury/illness: Yes [Indicate if the patient's complaints are consistent with his/her history of the injury/illness.] : Indicate if the patient's complaints are consistent with his/her history of the injury/illness: Yes [Yes] : Yes, it is consistent [Can the patient return to usual work activities as indicated? If yes, indicate date___] : The patient can return to usual work activities on [unfilled] [Are there any work limitations? (If so, explain and quantify, including the anticipated duration of the limitations)] : There are work limitations. [FreeTextEntry1] : sergei today: mild restriction, persistent. loss of 19 ml/yr fev1 since 2007. a. persistent della, pending new cpap machine. pt under follow up for prostate ca. chronic sinusitis, under ent follow up, overall stable on meds. all of these patient's medical conditions are active, permanent, persistent and chronic. he will need medical treatment and meds most probably for the rest of his life. he has stopped working as per my recommendation, especially due to his wtc conditions.   [FreeTextEntry5] : 100 [FreeTextEntry6] : clinical examination, cxr, pft, biopsies [FreeTextEntry7] : avoid exposure to triggers, avoid driving.

## 2024-08-21 NOTE — REASON FOR VISIT
[Follow-Up] : a [unfilled] follow-up visit  [FreeTextEntry2] : 9/13/2001 [FreeTextEntry1] : visit conducted in Samoan

## 2024-12-10 ENCOUNTER — EMERGENCY (EMERGENCY)
Facility: HOSPITAL | Age: 69
LOS: 1 days | Discharge: LEFT BEFORE DISCHARGE | End: 2024-12-10
Admitting: EMERGENCY MEDICINE
Payer: MEDICARE

## 2024-12-10 VITALS
DIASTOLIC BLOOD PRESSURE: 78 MMHG | RESPIRATION RATE: 18 BRPM | HEART RATE: 79 BPM | SYSTOLIC BLOOD PRESSURE: 124 MMHG | TEMPERATURE: 98 F | WEIGHT: 175.05 LBS | OXYGEN SATURATION: 97 %

## 2024-12-10 DIAGNOSIS — Z98.890 OTHER SPECIFIED POSTPROCEDURAL STATES: Chronic | ICD-10-CM

## 2024-12-10 PROCEDURE — 99283 EMERGENCY DEPT VISIT LOW MDM: CPT

## 2024-12-10 NOTE — ED ADULT NURSE NOTE - NSFALLOOBATTEMPT_ED_ALL_ED
After Visit Summary   10/18/2017    Kayla Lugo    MRN: [id number]           Patient Information     Date Of Birth          1950        Visit Information        Provider Department      10/18/2017 9:00 AM [place] [facility]        Today's Diagnoses     Need for prophylactic vaccination and inoculation against influenza    -  1       Follow-ups after your visit        Your next 10 appointments already scheduled     Nov 20, 2017 10:15 AM CST   (Arrive by 10:00 AM)   SHORT with Natalia Starr MD   [facility] ([facility] )    [address]  [facility] [address]   [phone]              Who to contact     If you have questions or need follow up information about today's clinic visit or your schedule please contact [facility] directly at [phone].  Normal or non-critical lab and imaging results will be communicated to you by MyChart, letter or phone within 4 business days after the clinic has received the results. If you do not hear from us within 7 days, please contact the clinic through MyChart or phone. If you have a critical or abnormal lab result, we will notify you by phone as soon as possible.  Submit refill requests through [employer] or call your pharmacy and they will forward the refill request to us. Please allow 3 business days for your refill to be completed.          Additional Information About Your Visit        MyChart Information     [employer] gives you secure access to your electronic health record. If you see a primary care provider, you can also send messages to your care team and make appointments. If you have questions, please call your primary care clinic.  If you do not have a primary care provider, please call [phone] and they will assist you.        Care EveryWhere ID     This is your Care EveryWhere ID. This could be used by other organizations to access your [facility]  medical records  [id number]         Blood Pressure from Last 3 Encounters:   08/01/17 128/80   07/29/16 134/78   12/01/15 135/80    Weight from Last 3 Encounters:   08/01/17 200 lb (90.7 kg)   07/29/16 200 lb (90.7 kg)   12/01/15 211 lb 1.6 oz (95.8 kg)              We Performed the Following     ADMIN INFLUENZA (For MEDICARE Patients ONLY) []     FLU VACCINE, INCREASED ANTIGEN, PRESV FREE, AGE 65+ [03767]        Primary Care Provider Office Phone # Fax #    Natalia FERCHO Starr [phone] [address] [facility] [address]        Equal Access to Services     ALYSSA ANTUNEZ : Megan Bojorquez, waaxda luscooteradaha, qaybta kaalmada abhishek, susana renner. So [facility] [phone].    ATENCIÓN: Si habla español, tiene a cordero disposición servicios gratuitos de asistencia lingüística. [facility] [phone].    We comply with applicable federal civil rights laws and [place] laws. We do not discriminate on the basis of race, color, national origin, age, disability, sex, sexual orientation, or gender identity.            Thank you!     Thank you for choosing [facility]  for your care. Our goal is always to provide you with excellent care. Hearing back from our patients is one way we can continue to improve our services. Please take a few minutes to complete the written survey that you may receive in the mail after your visit with us. Thank you!             Your Updated Medication List - Protect others around you: Learn how to safely use, store and throw away your medicines at www.disposemymeds.org.          This list is accurate as of: 10/18/17  9:01 AM.  Always use your most recent med list.                   Brand Name Dispense Instructions for use Diagnosis    ACCU-CHEK SMARTVIEW test strip   Generic drug:  blood glucose monitoring     100 each    use to test 3 times daily or as directed    Diabetes mellitus, type 2 (H)        atorvastatin 10 MG tablet    LIPITOR    30 tablet    Take 1 tablet (10 mg) by mouth daily    Hyperlipidemia, unspecified hyperlipidemia type       blood glucose monitoring lancets     102 Box    use to test  blood sugar 3 times daily or as directed    Diabetes mellitus, type 2 (H)       cholecalciferol 1000 UNIT tablet    vitamin D     Take 4 tablets by mouth daily.        fish oil-omega-3 fatty acids 1000 MG capsule      Take 3 capsules by mouth daily.        ibuprofen 600 MG tablet    ADVIL/MOTRIN    90 tablet    Take 1 tablet (600 mg) by mouth every 6 hours as needed for moderate pain    Back pain       levothyroxine 100 MCG tablet    SYNTHROID/LEVOTHROID    90 tablet    Take 1 tablet (100 mcg) by mouth daily    Hypothyroidism, unspecified type       metFORMIN 500 MG 24 hr tablet    GLUCOPHAGE-XR    30 tablet    Take 1 tablet (500 mg) by mouth daily (with dinner)    Type 2 diabetes mellitus without complication, without long-term current use of insulin (H)       polyethylene glycol powder    MIRALAX    510 g    Take 17 g (1 capful) by mouth daily    Constipation, unspecified constipation type          No

## 2024-12-10 NOTE — ED PROVIDER NOTE - PATIENT PORTAL LINK FT
You can access the FollowMyHealth Patient Portal offered by [facility] by registering at the following website: [email or web address]. By joining [employer]’s FollowMyHealth portal, you will also be able to view your health information using other applications (apps) compatible with our system.

## 2024-12-10 NOTE — ED PROVIDER NOTE - CLINICAL SUMMARY MEDICAL DECISION MAKING FREE TEXT BOX
This is a 69 yr old M, pmh dm with c/o increased anxiety. He is retired lives with his wife and son.  Pt reports stressors as his mother passed away couple of years ago and he wants to purchase and buy out his siblings, but they do not agree on many things. That makes him very sad. He has an upcoming trip this weekend to [place].   " I love life" Explicitly denies si, hi, ah, vh, paranoia, delusion, physical complaint at this time.     collateral info by sw, refer to the note.   On reeval, patient is calm, alert and oriented x 4, pt did not test limits and maintained appropriate boundaries, while in .

## 2024-12-10 NOTE — ED ADULT NURSE NOTE - IS THE PATIENT ABLE TO BE SCREENED?
Speech Therapy      Visit Type: Evaluation  -  Clinical swallow    Precautions     - Medical precautions:  swallowing precautions; airborne precautions. Covid +    - Oxygen: high flow nasal cannula (60%, 15 L). - Lines in chart and on patient reviewed; cautions maintained through out session    - Affect/Behavior: alert, pleasant and cooperative    Current Diet: general and thin liquids      - Dentition: intact    - Feeding: feeds self    SUBJECTIVE  Patient is a 80year old female admitted to [facility] on 11/7/2021 with increasing SOB, SARS- CoV 2 positive, hypoxemic requiring supplemental oxygen. PMH is significant for renal transplant on chronic immunosuppressants, HTN, DM. Recently hospitalized 10/16 -10/18/21 for an acute Urinary Tract Infection. Speech Therapy consulted d/t coughing during intake and decreased PO intake totals. Patient reportedly follows a General/Thin diet at baseline without history of dysphagia. Â    11/17/21: CXR completed. Results pending at time of evaluation. Â     Â     Â   Â     Â     Â     Â Patient agreed to participate in therapy this date. Patient/family goals: return to previous functional status     OBJECTIVE     Oral Mechanism   Oral Motor Assessment: generalized weakness  Saliva Control: intact     Swallowing   No temperature spike noted. Patient was not intubated on this admission   Patient positioning: upright in bed  Pretrial vocal quality: normal  Volitional swallow: present    Consistencies: Thin Liquid (cup):    - Oral: intact     - Pharyngeal: intact  Thin Liquid (straw):     - Oral: impaired, impaired bolus control and suspect premature spillage   - Pharyngeal: impaired, immediate cough  Thin liquid comments: With increased bolus size and consecutive swallows patient demonstrated reduced bolus control and suspected premature spillage resulting in immediate cough.  Cough and desaturation noted with consecutive swallows of thin likely d/t holding breath for prolonged periods. General Consistency:    - Oral: impaired, slow mastication   - Pharyngeal: intact            ASSESSMENT                                                                        â¢ Impairments: swallowing  â¢ Functional Limitations: eating/drinking  â¢ Skilled therapy is required to establish safe means of nutrition, hydration and medication administration  â¢ Patient's overall level of function is near baseline for swallow function. Session emphasized clinical swallow function assessment at the bedside. Patient is awake, alert and received positioned upright in bed during AM meal. Native dentition present and in good repair. Oral motor examination positive for generalized weakness. Volitional cough is adequate for airway protection. Patient provided with trials of thin via cup/straw and General texture solids. Oral phase notable for slow mastication and suspected premature spillage with large sips of thin. Pharyngeal phase remarkable for immediate cough with large bolus size. Cough also noted following consecutive swallows of thin likely d/t prolonged breath holding. No clinical indication of laryngeal penetration, aspiration or change in vitals with small single sips of thin via cup/straw. Patient able to feed self. Improved safety awareness noted following education and initial cues. Recommend continuation with current diet of General texture solids and thin liquids. Medications may be presented whole in puree, large pills cut in half. Patient would additionally benefit from periodic supervision during intake to monitor diet tolerance and to ensure consistent use of safe swallow strategies.    â¢ Rehab Potential: good    Education Provided:   Learning assessment:  - Primary learner: patient  - Are they ready to learn: yes  - Preferred learning style: verbal  - Barriers to learning: language  Education provided during session:  - Receiving education: patient  - dysphagia  - Results of above outlined education: Needs reinforcement  Patient at end of session:    - location: in bed    - safety measures: alarm system in place/re-engaged and call light within reach    - hand off to: nurse    PLAN  Plan for next session: assess tolerance of least restrictive oral diet. Requires SLP Follow Up: Yes  Frequency: 1 follow-up session  SLP Identified Barriers to Discharge: medical   Recommendations for Discharge: SLP: post acute therapy not anticipated       Interventions:  Assess tolerance of least restrictive oral diet and patient/family education    Plan/Goal Agreement:  Patient agrees with goals and treatment plan    RECOMMENDATIONS     -Diet:          *thin liquids and general    -Medication Administration:         *whole and with puree    -Feeding Guidelines:          *feeds self, sit fully upright for all po intake, no talking while eating, periodic liquid wash, set up tray, small bites/sips and eat slowly only when alert    -Speech Reviewed Swallow:         *with patient/family, with clinical caregivers and feeding guidelines posted in room    GOALS  Review Date: 11/24/2021  Long Term Goals:   1. Patient will consume General, Thin Liquids diet with optimum safety and efficiency of swallow function with less than 10% signs/symptoms of aspiration. 2.  Patient will follow swallowing guidelines with min cues. Documented in the chart in the following areas: Prior Living. Pain. Assessment.       Therapy procedure time and total treatment time can be found documented on the Time Entry flowsheet Yes

## 2024-12-10 NOTE — ED ADULT TRIAGE NOTE - GLASGOW COMA SCALE: EYE OPENING, MLM
Bed/Stretcher in lowest position, wheels locked, appropriate side rails in place/Call bell, personal items and telephone in reach/Instruct patient to call for assistance before getting out of bed/chair/stretcher/Non-slip footwear applied when patient is off stretcher/[place] to call system/Physically safe environment - no spills, clutter or unnecessary equipment/Purposeful proactive rounding/Room/bathroom lighting operational, light cord in reach
(E4) spontaneous

## 2024-12-10 NOTE — ED PROVIDER NOTE - OBJECTIVE STATEMENT
This is a 69 yr old M, pmh dm with c/o increased anxiety. He is retired lives with his wife and son.  Pt reports stressors as his mother passed away couple of years ago and he wants to purchase and buy out his siblings, but they do not agree on many things. That makes him very sad. He has an upcoming trip this weekend to [place].   " I love life" Explicitly denies si, hi, ah, vh, paranoia, delusion, physical complaint at this time.

## 2024-12-10 NOTE — ED PROVIDER NOTE - NSFOLLOWUPINSTRUCTIONS_ED_ALL_ED_FT
You have been given information necessary to follow up with the  [facility] ([facility]) Crisis center & other outpatient  psychiatric clinics within your community    • [facility] walk in Crisis centre  [address] [place] [address] [phone] [email or web address]  Hours of operation:  Day	                                        Hours  Sunday                                  Closed  Monday                                9am - 3pm  Tuesday                                9am - 3pm  Wednesday                          9am - 3pm  Thursday                               9am - 3pm  Friday                                    9am - 3pm  Saturday                                Closed    .....additionally if your current problem is associated with drug or alcohol abuse further information can be obtained at the Drug Abuse Evaluation Health Referral Servce ([facility])    • [facility] clinic [address] [place] [address] [phone] [email or web address]    Additionally if more support and information and help is needed in the area of suicide prevention pleas3 feel free to contact :   • Suicide Prevention [place] [address]  Phone: 7-010-368-KKDR (9348)  Web Address: http://www.suicidepreventionlifeline.org  • Suicide Awareness Voices of Education  [address] [place] [place], [place] [id number]  Phone: 1-520.799.1416  Web Address: http://www.save.org    Anxiety    WHAT YOU NEED TO KNOW:    Anxiety is a condition that causes you to feel extremely worried or nervous. The feelings are so strong that they can cause problems with your daily activities or sleep. Anxiety may be triggered by something you fear, or it may happen without a cause. Family or work stress, smoking, caffeine, and alcohol can increase your risk for anxiety. Certain medicines or health conditions can also increase your risk. Anxiety can become a long-term condition if it is not managed or treated.    DISCHARGE INSTRUCTIONS:    Call your local emergency number (911 in the [place]) if:    You have chest pain, tightness, or heaviness that may spread to your shoulders, arms, jaw, neck, or back.    You feel like hurting yourself or someone else.  Call your doctor if:    Your symptoms get worse or do not get better with treatment.    Your anxiety keeps you from doing your regular daily activities.    You have new symptoms since your last visit.    You have questions or concerns about your condition or care.  Medicines:    Medicines may be given to help you feel more calm and relaxed, and decrease your symptoms.    Take your medicine as directed. Contact your healthcare provider if you think your medicine is not helping or if you have side effects. Tell him of her if you are allergic to any medicine. Keep a list of the medicines, vitamins, and herbs you take. Include the amounts, and when and why you take them. Bring the list or the pill bottles to follow-up visits. Carry your medicine list with you in case of an emergency.  Manage anxiety:    Talk to someone about your anxiety. Your healthcare provider may suggest counseling. Cognitive behavioral therapy can help you understand and change how you react to events that trigger your symptoms. You might feel more comfortable talking with a friend or family member about your anxiety. Choose someone you know will be supportive and encouraging.    Find ways to relax. Activities such as exercise, meditation, or listening to music can help you relax. Spend time with friends, or do things you enjoy.    Practice deep breathing. Deep breathing can help you relax when you feel anxious. Focus on taking slow, deep breaths several times a day, or during an anxiety attack. Breathe in through your nose and out through your mouth.    Create a regular sleep routine. Regular sleep can help you feel calmer during the day. Go to sleep and wake up at the same times every day. Do not watch television or use the computer right before bed. Your room should be comfortable, dark, and quiet.    Eat a variety of healthy foods. Healthy foods include fruits, vegetables, low-fat dairy products, lean meats, fish, whole-grain breads, and cooked beans. Healthy foods can help you feel less anxious and have more energy.  Healthy Foods      Exercise regularly. Exercise can increase your energy level. Exercise may also lift your mood and help you sleep better. Your healthcare provider can help you create an exercise plan.  Walking for Exercise      Do not smoke. Nicotine and other chemicals in cigarettes and cigars can increase anxiety. Ask your healthcare provider for information if you currently smoke and need help to quit. E-cigarettes or smokeless tobacco still contain nicotine. Talk to your healthcare provider before you use these products.    Do not have caffeine. Caffeine can make your symptoms worse. Do not have foods or drinks that are meant to increase your energy level.    Limit or do not drink alcohol. Ask your healthcare provider if alcohol is safe for you. You may not be able to drink alcohol if you take certain anxiety or depression medicines. Limit alcohol to 1 drink per day if you are a woman. Limit alcohol to 2 drinks per day if you are a man. A drink of alcohol is 12 ounces of beer, 5 ounces of wine, or 1½ ounces of liquor.    Do not use drugs. Drugs can make your anxiety worse. It can also make anxiety hard to manage. Talk to your healthcare provider if you use drugs and want help to quit.  Follow up with your doctor within 2 weeks or as directed: Write down your questions so you remember to ask them during your visits.

## 2024-12-10 NOTE — ED BEHAVIORAL HEALTH NOTE - BEHAVIORAL HEALTH NOTE
At the provider's request, the writer contacted the patient's son, Lucio Albrecht ([phone]). The patient lives with his wife, grandchildren, and two stepdaughters. He has no diagnosed mental health conditions and is not currently receiving psychiatric or therapeutic care.    Lucio brought his father to the hospital due to unusual behavior and concerns about his well-being. He reported that the patient was not feeling well, seemed unlike himself, and has been experiencing mood swings since last night. This is the first time Lucio has sought medical attention for his father's mental health. The patient denies suicidal or homicidal ideation and has no history of attempts. He denies drug use but reports social alcohol consumption and no violent or aggressive behaviors. He is compliant with his diabetes medication.    A family dispute over a house in [place], involving the patient's siblings, has been causing him significant stress. Both of his parents reside in [place] and are reportedly difficult to contact, leading to ongoing communication challenges. However, this is the first time his family has observed him in this state. He exhibits no memory loss and his sleeping, eating, and hygiene habits remain consistent.    During his distress, the patient experienced what his son described as a nervous breakdown, including shortness of breath. He has purchased a flight to [place] [employer] for Saturday, which his family is discouraging due to his current condition. He exhibits no other dangerous behaviors. At the provider's request, the writer contacted the patient's son, Lucio Albrecht ([phone]). The patient lives with his wife, grandchildren, and two stepdaughters. He has no diagnosed mental health conditions and is not currently receiving psychiatric or therapeutic care.    Lucio brought his father to the hospital due to unusual behavior and concerns about his well-being. He reported that the patient was not feeling well, seemed unlike himself, and has been experiencing mood swings since last night. This is the first time Lucio has sought medical attention for his father's mental health. The patient denies suicidal or homicidal ideation and has no history of attempts. He denies drug use but reports social alcohol consumption and no violent or aggressive behaviors. He is compliant with his diabetes medication.    A family dispute over a house in [place], involving the patient's siblings, has been causing him significant stress. Both of his parents reside in [place] and are reportedly difficult to contact, leading to ongoing communication challenges. However, this is the first time his family has observed him in this state. He exhibits no memory loss and his sleeping, eating, and hygiene habits remain consistent.    During his distress, the patient experienced what his son described as a nervous breakdown, including shortness of breath. He has purchased a flight to [place] for Saturday, which his family is discouraging due to his current condition. He exhibits no other dangerous behaviors.    Worker called pt's son and informed of discharge.    worker met with patient who states that he is stressed out with his family dynamics over his parents home. No SI/HI listed. He was given [facility] cc information. Pt refuses apt to be made.

## 2024-12-10 NOTE — ED ADULT NURSE NOTE - OBJECTIVE STATEMENT
Patient brought to  area for c/o depression. Family states patient has been depressed for a couple of hours. No psych history. Calm and cooperative.. Patient evaluated by medical provider and is waiting for further evaluation and disposition.

## 2024-12-11 ENCOUNTER — APPOINTMENT (OUTPATIENT)
Dept: OTHER | Facility: CLINIC | Age: 69
End: 2024-12-11
Payer: COMMERCIAL

## 2024-12-11 ENCOUNTER — APPOINTMENT (OUTPATIENT)
Dept: UROLOGY | Facility: CLINIC | Age: 69
End: 2024-12-11
Payer: COMMERCIAL

## 2024-12-11 VITALS
TEMPERATURE: 97.5 F | OXYGEN SATURATION: 98 % | HEART RATE: 85 BPM | SYSTOLIC BLOOD PRESSURE: 109 MMHG | HEIGHT: 65 IN | WEIGHT: 171 LBS | RESPIRATION RATE: 16 BRPM | DIASTOLIC BLOOD PRESSURE: 73 MMHG | BODY MASS INDEX: 28.49 KG/M2

## 2024-12-11 VITALS
SYSTOLIC BLOOD PRESSURE: 112 MMHG | WEIGHT: 170 LBS | BODY MASS INDEX: 28.32 KG/M2 | DIASTOLIC BLOOD PRESSURE: 76 MMHG | HEART RATE: 84 BPM | RESPIRATION RATE: 18 BRPM | HEIGHT: 65 IN | TEMPERATURE: 97.5 F | OXYGEN SATURATION: 97 %

## 2024-12-11 DIAGNOSIS — N52.9 MALE ERECTILE DYSFUNCTION, UNSPECIFIED: ICD-10-CM

## 2024-12-11 DIAGNOSIS — G47.33 OBSTRUCTIVE SLEEP APNEA (ADULT) (PEDIATRIC): ICD-10-CM

## 2024-12-11 DIAGNOSIS — C61 MALIGNANT NEOPLASM OF PROSTATE: ICD-10-CM

## 2024-12-11 DIAGNOSIS — J32.9 CHRONIC SINUSITIS, UNSPECIFIED: ICD-10-CM

## 2024-12-11 PROCEDURE — 99214 OFFICE O/P EST MOD 30 MIN: CPT

## 2024-12-11 PROCEDURE — G2211 COMPLEX E/M VISIT ADD ON: CPT

## 2024-12-11 RX ORDER — BUDESONIDE 0.5 MG/2ML
0.5 INHALANT ORAL DAILY
Qty: 3 | Refills: 1 | Status: ACTIVE | COMMUNITY
Start: 2024-12-11 | End: 1900-01-01

## 2024-12-11 RX ORDER — TADALAFIL 20 MG/1
20 TABLET ORAL
Qty: 35 | Refills: 1 | Status: ACTIVE | COMMUNITY
Start: 2024-12-11 | End: 1900-01-01

## 2024-12-12 LAB — PSA SERPL-MCNC: 6.9 NG/ML

## 2024-12-16 ENCOUNTER — APPOINTMENT (OUTPATIENT)
Dept: GERIATRICS | Facility: CLINIC | Age: 69
End: 2024-12-16

## 2025-03-18 ENCOUNTER — APPOINTMENT (OUTPATIENT)
Dept: UROLOGY | Facility: CLINIC | Age: 70
End: 2025-03-18
Payer: COMMERCIAL

## 2025-03-18 VITALS
SYSTOLIC BLOOD PRESSURE: 100 MMHG | HEART RATE: 76 BPM | TEMPERATURE: 92.5 F | DIASTOLIC BLOOD PRESSURE: 67 MMHG | OXYGEN SATURATION: 98 %

## 2025-03-18 DIAGNOSIS — C61 MALIGNANT NEOPLASM OF PROSTATE: ICD-10-CM

## 2025-03-18 PROCEDURE — G2211 COMPLEX E/M VISIT ADD ON: CPT

## 2025-03-18 PROCEDURE — 99214 OFFICE O/P EST MOD 30 MIN: CPT

## 2025-03-19 LAB
BACTERIA UR CULT: NORMAL
PSA SERPL-MCNC: 6.37 NG/ML

## 2025-04-02 ENCOUNTER — APPOINTMENT (OUTPATIENT)
Dept: OTHER | Facility: CLINIC | Age: 70
End: 2025-04-02
Payer: COMMERCIAL

## 2025-04-02 VITALS
OXYGEN SATURATION: 100 % | DIASTOLIC BLOOD PRESSURE: 69 MMHG | BODY MASS INDEX: 27.32 KG/M2 | HEIGHT: 65 IN | TEMPERATURE: 97.9 F | RESPIRATION RATE: 15 BRPM | SYSTOLIC BLOOD PRESSURE: 109 MMHG | WEIGHT: 164 LBS | HEART RATE: 86 BPM

## 2025-04-02 DIAGNOSIS — C61 MALIGNANT NEOPLASM OF PROSTATE: ICD-10-CM

## 2025-04-02 DIAGNOSIS — J32.9 CHRONIC SINUSITIS, UNSPECIFIED: ICD-10-CM

## 2025-04-02 DIAGNOSIS — F43.10 POST-TRAUMATIC STRESS DISORDER, UNSPECIFIED: ICD-10-CM

## 2025-04-02 DIAGNOSIS — G47.33 OBSTRUCTIVE SLEEP APNEA (ADULT) (PEDIATRIC): ICD-10-CM

## 2025-04-02 PROCEDURE — 99214 OFFICE O/P EST MOD 30 MIN: CPT

## 2025-04-05 ENCOUNTER — NON-APPOINTMENT (OUTPATIENT)
Age: 70
End: 2025-04-05

## 2025-04-07 ENCOUNTER — NON-APPOINTMENT (OUTPATIENT)
Age: 70
End: 2025-04-07

## 2025-04-30 ENCOUNTER — APPOINTMENT (OUTPATIENT)
Dept: GASTROENTEROLOGY | Facility: CLINIC | Age: 70
End: 2025-04-30
Payer: COMMERCIAL

## 2025-04-30 VITALS
BODY MASS INDEX: 27.49 KG/M2 | SYSTOLIC BLOOD PRESSURE: 110 MMHG | OXYGEN SATURATION: 100 % | HEIGHT: 65 IN | HEART RATE: 88 BPM | WEIGHT: 165 LBS | TEMPERATURE: 97 F | DIASTOLIC BLOOD PRESSURE: 66 MMHG

## 2025-04-30 DIAGNOSIS — R07.89 OTHER CHEST PAIN: ICD-10-CM

## 2025-04-30 DIAGNOSIS — R19.7 DIARRHEA, UNSPECIFIED: ICD-10-CM

## 2025-04-30 DIAGNOSIS — Z86.0100 PERSONAL HISTORY OF COLON POLYPS, UNSPECIFIED: ICD-10-CM

## 2025-04-30 DIAGNOSIS — K21.9 GASTRO-ESOPHAGEAL REFLUX DISEASE W/OUT ESOPHAGITIS: ICD-10-CM

## 2025-04-30 DIAGNOSIS — R63.4 ABNORMAL WEIGHT LOSS: ICD-10-CM

## 2025-04-30 DIAGNOSIS — K31.A0 GASTRIC INTESTINAL METAPLASIA, UNSPECIFIED: ICD-10-CM

## 2025-04-30 DIAGNOSIS — R10.13 EPIGASTRIC PAIN: ICD-10-CM

## 2025-04-30 PROCEDURE — 99214 OFFICE O/P EST MOD 30 MIN: CPT

## 2025-04-30 RX ORDER — POLYETHYLENE GLYCOL 3350 AND ELECTROLYTES WITH LEMON FLAVOR 236; 22.74; 6.74; 5.86; 2.97 G/4L; G/4L; G/4L; G/4L; G/4L
236 POWDER, FOR SOLUTION ORAL
Qty: 1 | Refills: 0 | Status: ACTIVE | COMMUNITY
Start: 2025-04-30 | End: 1900-01-01

## 2025-05-07 ENCOUNTER — NON-APPOINTMENT (OUTPATIENT)
Age: 70
End: 2025-05-07

## 2025-05-07 LAB
C DIFF TOXIN B QL PCR REFLEX: NORMAL
CALPROTECTIN FECAL: 66 UG/G
FAT STL QN: NORMAL
FAT STL QN: NORMAL
GDH ANTIGEN: NOT DETECTED
GI PCR PANEL: NOT DETECTED
HEMOCCULT STL QL IA: NEGATIVE
LACTOFERRIN STL-MCNC: 1.98 CD:794062635
PANCREATIC ELASTASE, FECAL: >800 CD:794062645
TOXIN A AND B: NOT DETECTED

## 2025-05-07 RX ORDER — SUCRALFATE 1 G/10ML
1 SUSPENSION ORAL
Qty: 1200 | Refills: 0 | Status: ACTIVE | COMMUNITY
Start: 2025-04-30 | End: 1900-01-01

## 2025-05-08 ENCOUNTER — NON-APPOINTMENT (OUTPATIENT)
Age: 70
End: 2025-05-08

## 2025-05-12 ENCOUNTER — NON-APPOINTMENT (OUTPATIENT)
Age: 70
End: 2025-05-12

## 2025-05-13 ENCOUNTER — APPOINTMENT (OUTPATIENT)
Dept: UROLOGY | Facility: CLINIC | Age: 70
End: 2025-05-13
Payer: COMMERCIAL

## 2025-05-13 VITALS
BODY MASS INDEX: 27.49 KG/M2 | SYSTOLIC BLOOD PRESSURE: 125 MMHG | HEIGHT: 65 IN | HEART RATE: 75 BPM | DIASTOLIC BLOOD PRESSURE: 80 MMHG | WEIGHT: 165 LBS | TEMPERATURE: 97.2 F | OXYGEN SATURATION: 99 %

## 2025-05-13 DIAGNOSIS — C61 MALIGNANT NEOPLASM OF PROSTATE: ICD-10-CM

## 2025-05-13 PROCEDURE — 55700: CPT

## 2025-05-13 PROCEDURE — 76999F: CUSTOM

## 2025-05-13 PROCEDURE — 76872 US TRANSRECTAL: CPT

## 2025-05-20 ENCOUNTER — APPOINTMENT (OUTPATIENT)
Dept: UROLOGY | Facility: CLINIC | Age: 70
End: 2025-05-20
Payer: COMMERCIAL

## 2025-05-20 VITALS
HEART RATE: 85 BPM | TEMPERATURE: 97 F | DIASTOLIC BLOOD PRESSURE: 75 MMHG | SYSTOLIC BLOOD PRESSURE: 118 MMHG | OXYGEN SATURATION: 98 %

## 2025-05-20 DIAGNOSIS — C61 MALIGNANT NEOPLASM OF PROSTATE: ICD-10-CM

## 2025-05-20 DIAGNOSIS — N52.9 MALE ERECTILE DYSFUNCTION, UNSPECIFIED: ICD-10-CM

## 2025-05-20 LAB — PROSTATE BIOPSY: NORMAL

## 2025-05-20 PROCEDURE — G2211 COMPLEX E/M VISIT ADD ON: CPT

## 2025-05-20 PROCEDURE — 99215 OFFICE O/P EST HI 40 MIN: CPT

## 2025-05-21 NOTE — ASU PATIENT PROFILE, ADULT - NS TRANSFER PATIENT BELONGINGS

## 2025-05-21 NOTE — ASU PATIENT PROFILE, ADULT - TEACHING/LEARNING FACTORS IMPACT ABILITY TO LEARN
"Anesthesia Transfer of Care Note    Patient: Jenny Mendezuley    Procedure(s) Performed: Procedure(s) (LRB):  TRANSESOPHAGEAL ECHOCARDIOGRAM (ROMEO) (N/A)    Patient location: ICU    Anesthesia Type: general (remained in ICU with all monitors)    Post pain: adequate analgesia    Post assessment: no apparent anesthetic complications    Post vital signs: stable    Level of consciousness: sedated and responds to stimulation    Nausea/Vomiting: no nausea/vomiting    Complications: none    Transfer of care protocol was followed      Last vitals:   Visit Vitals  BP (!) 111/55 (BP Location: Right arm, Patient Position: Lying, BP Method: Automatic)   Pulse (!) 125   Temp 36.9 °C (98.4 °F) (Oral)   Resp 20   Ht 5' 1" (1.549 m)   Wt 84.4 kg (186 lb 1.1 oz)   SpO2 95%   Breastfeeding? No   BMI 35.16 kg/m²     "
communication limitations

## 2025-05-21 NOTE — ASU PATIENT PROFILE, ADULT - ABILITY TO HEAR (WITH HEARING AID OR HEARING APPLIANCE IF NORMALLY USED):
*Physical Exam





- Vital Signs


 Last Vital Signs











Temp Pulse Resp BP Pulse Ox


 


 99.3 F   137 H  18   126/74   93 L


 


 03/23/19 09:27  03/23/19 09:27  03/23/19 09:27  03/23/19 09:27  03/23/19 09:27














ED Treatment Course





- LABORATORY


CBC & Chemistry Diagram: 


 03/23/19 10:00





 03/23/19 10:00





Medical Decision Making





- Medical Decision Making





03/23/19 09:49





Pt seen by the Advanced Practice Provider under my direct supervision


Ancillary studies reviewed





I agree with plan as outlined by the Advanced Practice Provider YAMILET Martinez





11 yo F c/ seizure history presents with upper abdominal pain and nausea and 

vomiting.


Labs notable for tachycardia.


Temp 99.3 degrees.


I agree with PA's plan to obtain blood work, treat symptoms and reassess.


If pt's symptoms and vitals improve, likely gastoenteritis.


However, if pt continues to have symptoms and/or persistent abdominal pain, 

consider imaging


03/23/19 09:59





I had examined the patient.


Abdominal exam: TTP epigastric. Guido and Mcburney point negative.


03/23/19 12:10





 CBC, BMP





 03/23/19 10:00 





 03/23/19 10:00 





 CMP











Sodium  137 mmol/L (136-145)   03/23/19  10:00    


 


Potassium  4.2 mmol/L (3.5-5.1)   03/23/19  10:00    


 


Chloride  103 mmol/L ()   03/23/19  10:00    


 


Carbon Dioxide  25 mmol/L (21-32)   03/23/19  10:00    


 


Anion Gap  9 MMOL/L (8-16)   03/23/19  10:00    


 


BUN  15 mg/dL (7-18)   03/23/19  10:00    


 


Creatinine  0.5 mg/dL (0.55-1.3)  L  03/23/19  10:00    


 


Creat Clearance w eGFR  No Result Required.   03/23/19  10:00    


 


Random Glucose  88 mg/dL ()   03/23/19  10:00    


 


Calcium  8.8 mg/dL (8.5-10.1)   03/23/19  10:00    


 


Total Bilirubin  0.6 mg/dL (0.2-1)   03/23/19  10:00    


 


Direct Bilirubin  0.2 mg/dL (0.0-0.2)   03/23/19  10:00    


 


AST  20 U/L (15-37)   03/23/19  10:00    


 


ALT  17 U/L (13-61)   03/23/19  10:00    


 


Alkaline Phosphatase  238 U/L ()  H  03/23/19  10:00    


 


Total Protein  7.4 g/dl (6.4-8.2)   03/23/19  10:00    


 


Albumin  4.1 g/dl (3.4-5.0)   03/23/19  10:00    


 


Lipase  89 U/L ()   03/23/19  10:00    








Pt feeling better.


Will PO challenge and if passes, can go home.





*DC/Admit/Observation/Transfer


Diagnosis at time of Disposition: 


 Epigastric pain in pediatric patient








- Discharge Dispostion


Disposition: HOME


Condition at time of disposition: Improved





- Referrals





- Patient Instructions


Printed Discharge Instructions:  DI for Abdominal Pain -- Child


Additional Instructions: 





Thank you for choosing [facility].  It was a pleasure taking 

care of you.  





Your lab work was unremarkable


Possibly your symptoms were related to gastritis? You were given Zantac here


Please follow-up with pediatrician for further evaluation of your symptoms.





Return to the Emergency Department if your symptoms worsen or persist or have 

other concerning symptoms. 





- Post Discharge Activity Adequate: hears normal conversation without difficulty

## 2025-05-21 NOTE — ASU PATIENT PROFILE, ADULT - FALL HARM RISK - UNIVERSAL INTERVENTIONS
Bed in lowest position, wheels locked, appropriate side rails in place/Call bell, personal items and telephone in reach/Instruct patient to call for assistance before getting out of bed or chair/Non-slip footwear when patient is out of bed/[place] to call system/Physically safe environment - no spills, clutter or unnecessary equipment/Purposeful Proactive Rounding/Room/bathroom lighting operational, light cord in reach

## 2025-05-22 ENCOUNTER — APPOINTMENT (OUTPATIENT)
Dept: GASTROENTEROLOGY | Facility: HOSPITAL | Age: 70
End: 2025-05-22

## 2025-05-22 ENCOUNTER — TRANSCRIPTION ENCOUNTER (OUTPATIENT)
Age: 70
End: 2025-05-22

## 2025-05-22 ENCOUNTER — OUTPATIENT (OUTPATIENT)
Dept: OUTPATIENT SERVICES | Facility: HOSPITAL | Age: 70
LOS: 1 days | End: 2025-05-22
Payer: COMMERCIAL

## 2025-05-22 ENCOUNTER — RESULT REVIEW (OUTPATIENT)
Age: 70
End: 2025-05-22

## 2025-05-22 VITALS
DIASTOLIC BLOOD PRESSURE: 71 MMHG | TEMPERATURE: 98 F | HEART RATE: 73 BPM | OXYGEN SATURATION: 100 % | HEIGHT: 66 IN | SYSTOLIC BLOOD PRESSURE: 116 MMHG | WEIGHT: 173.94 LBS | RESPIRATION RATE: 18 BRPM

## 2025-05-22 VITALS
RESPIRATION RATE: 20 BRPM | SYSTOLIC BLOOD PRESSURE: 138 MMHG | OXYGEN SATURATION: 100 % | HEART RATE: 72 BPM | DIASTOLIC BLOOD PRESSURE: 83 MMHG

## 2025-05-22 DIAGNOSIS — K21.9 GASTRO-ESOPHAGEAL REFLUX DISEASE WITHOUT ESOPHAGITIS: ICD-10-CM

## 2025-05-22 DIAGNOSIS — Z98.890 OTHER SPECIFIED POSTPROCEDURAL STATES: Chronic | ICD-10-CM

## 2025-05-22 PROCEDURE — 88312 SPECIAL STAINS GROUP 1: CPT | Mod: 26

## 2025-05-22 PROCEDURE — 45378 DIAGNOSTIC COLONOSCOPY: CPT

## 2025-05-22 PROCEDURE — 88312 SPECIAL STAINS GROUP 1: CPT

## 2025-05-22 PROCEDURE — 43239 EGD BIOPSY SINGLE/MULTIPLE: CPT

## 2025-05-22 PROCEDURE — G0105: CPT

## 2025-05-22 PROCEDURE — 88305 TISSUE EXAM BY PATHOLOGIST: CPT

## 2025-05-22 PROCEDURE — 88305 TISSUE EXAM BY PATHOLOGIST: CPT | Mod: 26

## 2025-05-22 DEVICE — ESOPHAGEAL BALLOON CATH CRE FIXED WIRE 10-11-12MM: Type: IMPLANTABLE DEVICE | Status: FUNCTIONAL

## 2025-05-22 DEVICE — ESOPHAGEAL BALLOON CATH CRE FIXED WIRE 8-9-10MM: Type: IMPLANTABLE DEVICE | Status: FUNCTIONAL

## 2025-05-22 DEVICE — ESOPHAGEAL BALLOON CATH CRE FIXED WIRE 6-7-8MM: Type: IMPLANTABLE DEVICE | Status: FUNCTIONAL

## 2025-05-22 NOTE — CHART NOTE - NSCHARTNOTEFT_GEN_A_CORE
History of Present Illness       The patient is a 69-year-old  male with past medical history significant for hypertension, hypercholesterolemia, diabetes mellitus on Rybelsus and Xigduo, [place] group grade 2 prostate cancer and hypothyroidism who was referred to my office by Dr. Lonny Pack for abdominal pain, dyspepsia, gastroesophageal reflux disease and atypical chest pain. The patient also admits to having diarrhea, change in bowel habits and change in caliber of stool.  The patient admitted to losing 14 pounds over 1 month.  The patient admits to losing another 10 pounds over the past 2 months.  The patient has a history of gastroparesis and gastric intestinal metaplasia.  The patient also admits to a history of colon polyps.   The patient states that he is feeling uncomfortable x 6 months. The patient denies any jaundice or pruritus. The patient denies any chronic lower back pain. The patient complains of abdominal pain. The patient describes the abdominal pain as burning, intermittent midepigastric discomfort that is nonradiating in nature. The abdominal pain is unrelated to stress. The abdominal pain is worse with meals and improves with passing gas or having a bowel movement. The abdominal pain is described as mild to moderate in nature. The abdominal pain occurs at night and in the morning. The abdominal pain can occur at any time. The abdominal pain has awakened the patient from sleep. The abdominal pain is slightly relieved with certain medication such as proton pump inhibitors, H2 blockers and antacids. The abdominal pain is associated with abdominal gas and bloating. The patient denies any nausea or vomiting. The patient complains of gastroesophageal reflux disease but denies any dysphagia. The gastroesophageal reflux disease is worse after meals and late at night and in the early morning. The gastroesophageal reflux disease is slightly improved with proton pump inhibitors, H2 blockers and antacids. The patient complains of atypical chest pain but denies any shortness of breath or palpitations. The patient is being followed by his cardiologist, Dr. Shreyas Adams ([phone])l. According to the patient, the cardiac status is stable. The chest pain is described as a burning, intermittent substernal discomfort that is nonradiating in nature. The patient denies any diaphoresis. The chest pain is described as 8 out of 10 in intensity. The chest pain can occur at any time. The chest pain is worse at night and early morning. The chest pain is unrelated to stress. The chest pain is unrelated to meals and passing gas. The chest pain has never awakened the patient from sleep. The patient complains of diarrhea but denies any constipation. The patient does not remember eating food prior to the symptoms. No other friends or family had been ill with similar complaints. The patient admits to recent travel to [facility] for 6 months and returned in March 2025. The patient admits to a similar episode in the past that resolved spontaneously. The patient denies taking antibiotics prior to the symptoms. The patient has 5 to 6 bowel movements a day. The diarrhea is described as watery in nature. The patient complains of a change in bowel habits. The patient complains of a change in caliber of stool. The patient denies having mucus discharge with the bowel movements. The patient denies any bright red blood per rectum, melena or hematemesis. The patient denies any rectal pain or rectal pruritus. The patient complains of weight loss but denies any anorexia. The patient admits to losing another 10 pounds over the past 2 months. The patient previously complained of weight loss but denied any anorexia. The patient admitted to losing 14 pounds over 1 month. The patient attributed the weight loss to recent change in diet and exercise. He denies any fevers or chills. The patient had an upper endoscopy performed at the [facility] endoscopy suite on June 20, 2024. The upper endoscopy was performed up to the level of the second portion of the duodenum. The upper endoscopy revealed mild diffuse bile gastritis with retained food in the body of the stomach suggestive of gastroparesis. Biopsies were taken of the distal esophagus, antrum, body of stomach and duodenum to assess for esophagitis, gastritis and duodenitis. The gastric pathology performed on June 20, 2024 revealed esophageal mucosa with unremarkable squamous mucosa (esophagus), gastric antral and body mucosa with chronic mild gastritis with intestinal (goblet cell) metaplasia that was negative for Helicobacter pylori (antrum and body of the stomach) and duodenal mucosa with small intestinal mucosa without significant pathology (duodenum). The patient tolerated the procedure well.  The stool GI PCR panel performed on May 1, 2025 was not detected. The fecal occult blood immunology test performed on May 1, 2025 was negative for occult blood in the stool. The stool GI PCR panel performed on March 4, 2025 was not detected. The stool for C. difficile toxin/GDH performed on May 1, 2025 was not detected. The stool quantitative lactoferrin level performed on May 1, 2025 was normal at 1.98. The fecal calprotectin level performed on May 1, 2025 ws normal at 66 ug/g. The fecal pancreatic elastase level performed on May 1, 2025 was normal at > 800. The stool fat performed on May 1, 2025 was normal. The blood work performed on August 2, 2023 revealed no evidence of anemia with a hemoglobin/hematocrit level of 15.8/47.8, respectively, a normal WBC count of 5.04 K/UL, a normal platelet count of 229,000, and elevated blood glucose of 265 mg/dL, normal liver enzymes with total bilirubin of 0.5 mg/dL, a normal alkaline phosphatase/AST/ALT of 61/16/25 U/L, respectively, and elevated PSA of 4.76 ng/mL, a normal total cholesterol level of 137 mg/dL, and elevated triglyceride level of 160 mg/dL. The urinalysis performed on August 2, 2023 revealed glucosuria of 500 mg/dL, and trace ketones. The patient is being followed by his cardiologist, Dr. Shreyas Ridley. According to the patient, the cardiac status is stable. The patient admits to having a prior colonoscopy performed by another gastroenterologist, Dr. Jacklyn Chau. The patient had a colonoscopy performed by another gastroenterologist, Dr. Winter Chau. The colonoscopy performed by another gastroenterologist, Dr. Winter Chau on August 13, 2021 revealed diverticulosis in the sigmoid colon, descending colon, transverse colon and ascending colon, internal hemorrhoids and normal mucosa of the entire examined colon and normal examined portion of the ileum. The colonic pathology performed on August 13, 2021 revealed colonic mucosa with focal fresh hemorrhage with negative for inflammation, microscopic colitis, granulomas or dysplasia (right colon) and colonic mucosa with focal fresh hemorrhage and lymphocyte aggregates that was negative for inflammation, microscopic colitis, granulomas or dysplasia. The patient denies any significant family history of GI problems.  ?  (+) Prior smoking1/4 PPD x5 years stopped x 25 years, (-) ETOH, (-) IVDA  ?  Physical Exam:  ?  General Appearance: Overweight, no acute distress  ENT: nose clear, ears unremarkable  Eyes: No enteric sclera, conjunctiva clear.  Neck: Supple, without masses  Respiratory: Breath sounds equal and bilateral, no wheezing no rales or rhonchi  Cardiovascular: S1-S2 audible, no murmur, no rubs or gallops  GI: (+) BS, soft, nontender, no rebound, no guarding, no masses  Liver: liver edge palpated  Musculo-skeletal: Good motor strength, good range of motion, normal appearing extremities  Skin: Normal appearing skin, no jaundice, no rashes or nodules  Neurological: without focal motor or sensory deficits Patient is moving all extremities spontaneously and to command with normal muscle strength alert and oriented X3  Psychiatric: Good affect, not depressed, not anxious      Gastroenterology Summary    Upper Endoscopy: The upper endoscopy performed at the [facility] endoscopy suite on June 20, 2024 revealed mild diffuse bile gastritis with retained food in the body of the stomach suggestive of gastroparesis. The gastric pathology performed on June 20, 2024 revealed esophageal mucosa with unremarkable squamous mucosa (esophagus), gastric antral and body mucosa with chronic mild gastritis with intestinal (goblet cell) metaplasia that was negative for Helicobacter pylori (antrum and body of the stomach) and duodenal mucosa with small intestinal mucosa without significant pathology (duodenum).    Colonoscopy: The colonoscopy performed by another gastroenterologist, Dr. Winter Chau on August 13, 2021 revealed diverticulosis in the sigmoid colon, descending colon, transverse colon and ascending colon, internal hemorrhoids and normal mucosa of the entire examined colon and normal examined portion of the ileum. The colonic pathology performed on August 13, 2021 revealed colonic mucosa with focal fresh hemorrhage with negative for inflammation, microscopic colitis, granulomas or dysplasia (right colon) and colonic mucosa with focal fresh hemorrhage and lymphocyte aggregates that was negative for inflammation, microscopic colitis, granulomas or dysplasia.  ?  Active Problems  Abdominal pain, epigastric (789.06) (R10.13)  Acquired hypothyroidism (244.9) (E03.9)  Adenocarcinoma of prostate (185) (C61)  Advance care planning (V65.49) (Z71.89)  Anemia (285.9) (D64.9)  Atypical chest pain (786.59) (R07.89)  Bilateral impacted cerumen (380.4) (H61.23)  Bilateral sensorineural hearing loss (389.18) (H90.3)  Broken tooth (873.63) (S02.5XXA)  Chronic superficial gastritis without bleeding (535.10) (K29.30)  Diabetic gastroparesis (250.60,536.3) (E11.43,K31.84)  Diarrhea, unspecified type (787.91) (R19.7)  Dyspepsia (536.8) (R10.13)  Encounter for alcohol cessation counseling (V65.42) (Z71.41)  Encounter for cancer screening (V76.9) (Z12.9)  Epistaxis, recurrent (784.7) (R04.0)  Flu vaccine need (V04.81) (Z23)  GERD (gastroesophageal reflux disease) (530.81) (K21.9)  GERD without esophagitis (530.81) (K21.9)  History of Hearing loss, bilateral (389.9) (H91.93)  History of colon polyps (V12.72) (Z86.0100)  History of erectile dysfunction (V13.89) (Z87.438)  Hyperlipidemia associated with type 2 diabetes mellitus (250.80,272.4) (E11.69,E78.5)  Hyperlipidemia, unspecified hyperlipidemia type (272.4) (E78.5)  Hypertension associated with type 2 diabetes mellitus (250.80,401.9) (E11.59,I15.2)  Impacted cerumen of left ear (380.4) (H61.22)  Impaired cognition (294.9) (R41.89)  Intestinal metaplasia of gastric mucosa (537.89) (K31.A0)  Male erectile disorder (607.84) (N52.9)  Malignant neoplasm of prostate (185) (C61)  Mild cognitive impairment (331.83) (G31.84)  Observation and evaluation for suspected conditions not found (V71.9) (Z04.9)  MACHELLE (obstructive sleep apnea) (327.23) (G47.33)  MACHELLE on CPAP (327.23) (G47.33)  Other seasonal allergic rhinitis (477.8) (J30.2)  PTSD (post-traumatic stress disorder) (309.81) (F43.10)  Reactive airway disease (493.90) (J45.909)  Screening for AAA (abdominal aortic aneurysm) (V81.2) (Z13.6)  Sinusitis, chronic (473.9) (J32.9)  Tooth loose (525.8) (K08.89)  Type 1 diabetes mellitus with other skin complication (250.81,709.8) (E10.628)  Type 2 diabetes mellitus with other skin complication (250.80,709.8) (E11.628)  Type 2 diabetes mellitus without complication, without long-term current use of insulin  (250.00) (E11.9)           ?  Past Medical History  History of Hearing loss, bilateral (389.9) (H91.93)  History of benign prostatic hyperplasia (V13.89) (Z87.438)  History of chronic sinusitis (V12.69) (Z87.09)  History of colonic polyps (V12.72) (Z86.0100)  History of diabetes mellitus (V12.29) (Z86.39)  History of erectile dysfunction (V13.89) (Z87.438)  History of obstructive sleep apnea (327.23) (Z86.69)           ?  Current Meds  Atorvastatin Calcium 80 MG Oral Tablet; TAKE 1 TABLET AT BEDTIME  Budesonide 0.5 MG/2ML Inhalation Suspension; INHALE 1 VIAL Daily with nasal lavage  Euthyrox 137 MCG Oral Tablet; Take 1 tablet in the morning on empty stomach 30  minutes before breakfast  Fexofenadine HCl - 60 MG Oral Tablet; TAKE 1 TABLET TWICE A DAY AS NEEDED FOR  ALLERGIES (REPLACES ALLEGRA D)  Gaviscon Extra Strength 160-105 MG Oral Tablet Chewable; Take 1 tablet as needed for  gas/burning abdominal pain every 6 hours  Omeprazole 40 MG Oral Capsule Delayed Release; TAKE 1 CAPSULE Every morning 30  minutes before breakfast  Papav-Phentolamine-Alprostadil 12-1-0.01 MG/ML SOLN  Ramipril 5 MG Oral Capsule  Rybelsus 14 MG Oral Tablet; Take 1 tablet daily  Sertraline HCl - 50 MG Oral Tablet; TAKE 1 TABLET DAILY AS DIRECTED. MDD:1 tb  Simethicone 125 MG Oral Tablet Chewable; CHEW AND SWALLOW 1 TABLET 4 TIMES  DAILY, AFTER MEALS AND AT BEDTIME  Sinus Rinse Kit Nasal Packet; USE AS DIRECTED  Squeeze Bottle Sinus Wash 2300-700 MG Nasal Kit; USE AS DIRECTED  Tadalafil 20 MG Oral Tablet; TAKE 1 TABLET 1 HOUR PRIOR TO SEXUAL  INTERCOURSE, MAXIMUM 6 PER 30 DAYS  Tadalafil 20 MG Oral Tablet; TAKE 1 TABLET 1 HOUR PRIOR TO SEXUAL  INTERCOURSE, MAXIMUM 6 PER 30 DAYS  Tri-Mix 150-5-50 MG-MG-MCG Intracavernosal Solution Reconstituted;  Papav-phentolamine-alprostadil 30-2-0.02 mg/ml Inject 0.2 ml into corpus cavernosum  15 minutes prior to sexual intercourse MDD:1 injection  Xigduo XR 5-1000 MG Oral Tablet Extended Release 24 Hour; Take 1 tablet daily           Allergies  No Known Drug Allergies           ?  Assessment  Weight loss, unintentional (783.21) (R63.4)  Diarrhea, unspecified type (787.91) (R19.7)  History of colon polyps (V12.72) (Z86.0100)  Abdominal pain, epigastric (789.06) (R10.13)  Atypical chest pain (786.59) (R07.89)  Dyspepsia (536.8) (R10.13)  Intestinal metaplasia of gastric mucosa (537.89) (K31.A0)  GERD (gastroesophageal reflux disease) (530.81) (K21.9)    Abdominal Pain: The patient complains of abdominal pain. The patient is to avoid nonsteroidal anti-inflammatory drugs and aspirin. I recommend a trial of Carafate suspension 1 gram/10 cc PO 4 times a day for 3 months for the symptoms.  Dyspepsia: The patient complains of dyspeptic symptoms. The patient was advised to continue to abide by an anti-gas (low FOD-MAP) diet. The patient was previously given a pamphlet for anti-gas (low FOD-MAP). The patient and I reviewed the anti-gas (low FOD-MAP)diet at length again. The patient is to continue on a trial of Simethicone one tablet 4 times a day p.r.n. abdominal pain and gas.  GERD: The patient was advised to avoid late-night meals and dietary indiscretions. The patient was advised to avoid fried and fatty foods. The patient was advised to abide by an anti-GERD diet. The patient was given a pamphlet for anti-GERD. The patient and I reviewed the anti-GERD diet at length. I recommend a trial of Sucralfate suspension 1 gram/10 cc 4 times a day x 3 months for the symptoms.  Atypical Chest Pain: The patient complains of atypical chest pain of unclear etiology. The patient was advised to follow up with the PMD and cardiologist regarding evaluation for the atypical chest pain. The patient was told of possible etiologies such as cardiac, pulmonary, GI, musculoskeletal, stress and other causes for the atypical chest pain. The patient agrees and will follow-up with the PMD and cardiologist, Dr. Shreyas Adams.  Diarrhea: The patient complains of diarrhea. I recommend a low residue diet. The patient is to avoid fiber supplementation. The patient is to consider starting a trial of a probiotic such as Align once a day. I recommend sending stool studies for GI PCR, fecal calprotectin, stool for lactoferrin, fecal elastase level, stool for fat, occult blood in the stool and C. difficile toxin to assess the diarrhea. to assess for an infectious etiology of the diarrhea. I recommend a colonoscopy to assess for colitis versus other causes. The patient was told of the risks and benefits of the procedure. The patient was told of the risks of perforation, emergency surgery, bleeding, infections and missed lesions. The patient agreed and will follow-up to reassess the symptoms.  Weight Loss: The patient previously complained of weight loss but denied any anorexia. The patient admitted to losing 14 pounds over 1 1/2 months. The patient attributed the weight loss to recent change in diet and exercise.  Prior Endoscopic Procedures: The patient had a prior colonoscopy performed by another gastroenterologist, Dr. Chau. I reviewed the prior colonoscopy reports. The patient signed a record release to obtain those records.  Gastritis: The patient has a history of gastritis noted on prior upper endoscopy. The patient is to avoid nonsteroidal anti-inflammatory drugs and aspirin. I recommend a trial of pantoprazole 40 mg once a day for 3 months for the symptoms.  Gastroparesis: The patient has a long history of diabetes mellitus. The patient has symptoms suggestive of diabetic gastroparesis. The patient may require a trial of a promotility agent for the symptoms pending the upper endoscopy results. I recommend a trial of Reglan (metoclopramide) 5 mg 4 times a day for 3 months. The patient and I had a long discussion regarding the risks of potential side effects of Reglan (metoclopramide). The patient was told of the risk of headaches, dizziness, diarrhea and permanent tremors. The patient was told to stop the medication immediately and call the office if any of these symptoms occur. The patient agrees.  Intestinal Metaplasia of the Stomach: The patient was found to have intestinal metaplasia of the stomach on prior upper endoscopy. The findings of intestinal metaplasia of the stomach have an increased risk of gastric cancer. The biopsies did not reveal dysplasia. I recommend a repeat upper endoscopy with mapping in 3 years to reassess for intestinal metaplasia and dysplasia unless symptomatic. The patient is aware of the increased risk of intestinal metaplasia and progression to stomach cancer. The patient agrees to follow-up.  Diverticulosis: I recommend a high-fiber diet and avoid seeds. The patient is to consider a trial of Metamucil once a day for fiber supplementation. The patient is to also consider a trial of a probiotic such as Align once a day. The patient and I discussed the potential risk of diverticulitis and diverticular bleeding secondary to diverticular disease. The patient is aware of the importance of follow-up if these complications arise.  Internal Hemorrhoids: The patient is to consider a trial of Anusol H. C. suppositories one per rectum nightly and Anusol HC2.5% cream apply to affected area twice a day p.r.n. hemorrhoidal bleeding or pain. I also recommend a trial of Calmoseptine cream apply to affected area twice a day for hemorrhoidal discomfort. I recommend Tucks pads for the hemorrhoids. I recommend Sitz baths twice a day for the hemorrhoids. I recommend avoid wearing tight underwear and use boxers. I recommend avoid touching the perineal area. The patient agreed to followup.  History of Colonic Polyps: The patient has a history of colonic polyps. I recommend a repeat colonoscopy to reassess for colonic polyps. The patient was told of the risks and benefits of the procedure. The patient was told of the risks of perforation, emergency surgery, bleeding, infections and missed lesions. The patient is to be on a clear liquid diet the entire day prior to the procedure. The patient is to complete the entire prescribed bowel prep the day prior to the procedure as directed. The patient is told not to drive, drink alcohol, use recreational drugs, exercise, or work the day of the procedure. The patient was told of the need for an escort to accompany the patient home after the procedure. The patient is aware that the procedure may be cancelled if they fail to follow the directions. The patient agreed and will schedule for the procedure. The patient can take the antihypertensive medication with a sip of water one hour prior to the procedure. The patient is to hold the diabetic medication the day before and the morning of the procedure. The patient is to hold the diabetic medication (Xigduo) for 4 days prior to the procedure. The patient is to be n.p.o. after midnight and bowel prep was given. The patient is to return for the procedure.  Colonoscopy: I recommend a colonoscopy to assess the symptoms and for colonic polyps. The patient was told of the risks and benefits of the procedure. The patient was told of the risks of perforation, emergency surgery, bleeding, infections and missed lesions. The patient is to be on a clear liquid diet the entire day prior to the procedure. The patient is to complete the entire prescribed bowel prep the day prior to the procedure as directed. The patient is told not to drive, drink alcohol, use recreational drugs, exercise, or work the day of the procedure. The patient was told of the need for an escort to accompany the patient home after the procedure. The patient is aware that the procedure may be cancelled if they fail to follow the directions. The patient agreed and will schedule for the procedure. The patient can take the antihypertensive medication with a sip of water one hour prior to the procedure. The patient is to hold the diabetic medication the day before and the morning of the procedure. The patient is to hold the diabetic medication (Xigduo) for 4 days prior to the procedure. The patient is to be n.p.o. after midnight and bowel prep was given. The patient is to return for the procedure.  Upper Endoscopy: I recommend an upper endoscopy to assess for peptic ulcer disease versus esophagitis. The patient was told of the risks and benefits of the procedure. The patient was told of the risks of perforation, emergency surgery, bleeding, infections and missed lesions. The patient is told not to drive, drink alcohol, use recreational drugs, exercise, or work the day of the procedure. The patient was told of the need for an escort to accompany the patient home after the procedure. The patient is aware that the procedure may be cancelled if they fail to follow the directions. The patient agreed and will schedule for the procedure. The patient can take the antihypertensive medication with a sip of water one hour prior to the procedure. The patient is to hold the diabetic medication the day the morning of the procedure. The patient is to hold the diabetic medication (Xigduo) for 4 days prior to the procedure. The patient is to be n.p.o. after midnight. The patient is to return for the procedure.  Follow-up: The patient is to follow-up in the office in 1 month to reassess the symptoms. The patient was told to call the office if any further problems.            ?  Plan  Diarrhea, unspecified type  C diff Toxin/GDH w/ Reflex to PCR; Status:Active; Requested for:[id number];  Calprotectin, Fecal; Status:Active; Requested for:[id number];  Fecal Occult Blood Immunology; Status:Active; Requested for:[id number];  GI PCR Panel, Stool; Status:Active; Requested for:[id number];  Lactoferrin, Quantitative, Stool; Status:Active; Requested for:[id number];  Pancreatic Elastase, Fecal; Status:Active; Requested for:[id number];  Stool Fat, Qualitative Level; Status:Active; Requested for:[id number];  GERD (gastroesophageal reflux disease)  Start: Sucralfate 1 GM/10ML Oral Suspension; TAKE 2 TEASPOONSFUL BY MOUTH 4  TIMES A DAY  Upper GI Endoscopy; Status:Active; Requested for:[id number];  History of colon polyps  Start: PEG-3350/Electrolytes 236 GM Oral Solution Reconstituted; MIX AS DIRECTED  AND DRINK OVER 4 HOURS, START AT 4PM  Colonoscopy Screening; Status:Hold For - Scheduling; Requested for:[id number];

## 2025-05-22 NOTE — ASU DISCHARGE PLAN (ADULT/PEDIATRIC) - FINANCIAL ASSISTANCE
[facility] provides services at a reduced cost to those who are determined to be eligible through [facility]’s financial assistance program. Information regarding [facility]’s financial assistance program can be found by going to [email or web address] or by calling [phone].

## 2025-05-27 LAB — SURGICAL PATHOLOGY STUDY: SIGNIFICANT CHANGE UP

## 2025-05-28 ENCOUNTER — APPOINTMENT (OUTPATIENT)
Dept: RADIATION ONCOLOGY | Facility: CLINIC | Age: 70
End: 2025-05-28
Payer: COMMERCIAL

## 2025-05-28 VITALS — RESPIRATION RATE: 18 BRPM | WEIGHT: 174 LBS | HEIGHT: 66 IN | BODY MASS INDEX: 27.97 KG/M2

## 2025-05-28 DIAGNOSIS — Z86.39 PERSONAL HISTORY OF OTHER ENDOCRINE, NUTRITIONAL AND METABOLIC DISEASE: ICD-10-CM

## 2025-05-28 DIAGNOSIS — E78.5 HYPERLIPIDEMIA, UNSPECIFIED: ICD-10-CM

## 2025-05-28 DIAGNOSIS — I10 ESSENTIAL (PRIMARY) HYPERTENSION: ICD-10-CM

## 2025-05-28 PROCEDURE — 99205 OFFICE O/P NEW HI 60 MIN: CPT

## 2025-06-11 ENCOUNTER — APPOINTMENT (OUTPATIENT)
Dept: UROLOGY | Facility: CLINIC | Age: 70
End: 2025-06-11

## 2025-07-23 ENCOUNTER — NON-APPOINTMENT (OUTPATIENT)
Age: 70
End: 2025-07-23

## 2025-07-23 ENCOUNTER — APPOINTMENT (OUTPATIENT)
Dept: GASTROENTEROLOGY | Facility: CLINIC | Age: 70
End: 2025-07-23
Payer: COMMERCIAL

## 2025-07-23 ENCOUNTER — APPOINTMENT (OUTPATIENT)
Dept: UROLOGY | Facility: CLINIC | Age: 70
End: 2025-07-23

## 2025-07-23 VITALS
BODY MASS INDEX: 28.12 KG/M2 | TEMPERATURE: 97.9 F | HEIGHT: 66 IN | WEIGHT: 175 LBS | HEART RATE: 85 BPM | OXYGEN SATURATION: 98 % | SYSTOLIC BLOOD PRESSURE: 118 MMHG | DIASTOLIC BLOOD PRESSURE: 76 MMHG

## 2025-07-23 DIAGNOSIS — K29.30 CHRONIC SUPERFICIAL GASTRITIS W/OUT BLEEDING: ICD-10-CM

## 2025-07-23 DIAGNOSIS — K57.90 DIVERTICULOSIS OF INTESTINE, PART UNSPECIFIED, W/OUT PERFORATION OR ABSCESS W/OUT BLEEDING: ICD-10-CM

## 2025-07-23 DIAGNOSIS — R11.0 NAUSEA: ICD-10-CM

## 2025-07-23 DIAGNOSIS — Z86.0100 PERSONAL HISTORY OF COLON POLYPS, UNSPECIFIED: ICD-10-CM

## 2025-07-23 DIAGNOSIS — K64.8 OTHER HEMORRHOIDS: ICD-10-CM

## 2025-07-23 DIAGNOSIS — R19.7 DIARRHEA, UNSPECIFIED: ICD-10-CM

## 2025-07-23 DIAGNOSIS — K21.00 GASTRO-ESOPHAGEAL REFLUX DISEASE WITH ESOPHAGITIS, WITHOUT BLEEDING: ICD-10-CM

## 2025-07-23 DIAGNOSIS — J32.9 CHRONIC SINUSITIS, UNSPECIFIED: ICD-10-CM

## 2025-07-23 DIAGNOSIS — R10.13 EPIGASTRIC PAIN: ICD-10-CM

## 2025-07-23 DIAGNOSIS — A09 INFECTIOUS GASTROENTERITIS AND COLITIS, UNSPECIFIED: ICD-10-CM

## 2025-07-23 PROCEDURE — 99214 OFFICE O/P EST MOD 30 MIN: CPT

## 2025-07-23 RX ORDER — DICYCLOMINE HYDROCHLORIDE 12.5 MG/1
10 CAPSULE ORAL 3 TIMES DAILY
Qty: 90 | Refills: 3 | Status: ACTIVE | COMMUNITY
Start: 2025-07-23 | End: 1900-01-01

## 2025-07-23 RX ORDER — SIMETHICONE 180 MG
180 CAPSULE ORAL 4 TIMES DAILY
Qty: 120 | Refills: 3 | Status: ACTIVE | COMMUNITY
Start: 2025-07-23 | End: 1900-01-01

## 2025-07-29 ENCOUNTER — APPOINTMENT (OUTPATIENT)
Dept: RADIATION ONCOLOGY | Facility: CLINIC | Age: 70
End: 2025-07-29
Payer: COMMERCIAL

## 2025-07-29 PROCEDURE — 99214 OFFICE O/P EST MOD 30 MIN: CPT

## 2025-07-30 ENCOUNTER — APPOINTMENT (OUTPATIENT)
Dept: UROLOGY | Facility: CLINIC | Age: 70
End: 2025-07-30
Payer: COMMERCIAL

## 2025-07-30 ENCOUNTER — APPOINTMENT (OUTPATIENT)
Dept: UROLOGY | Facility: CLINIC | Age: 70
End: 2025-07-30

## 2025-07-30 VITALS
TEMPERATURE: 97.2 F | BODY MASS INDEX: 26.84 KG/M2 | HEART RATE: 80 BPM | HEIGHT: 66 IN | OXYGEN SATURATION: 96 % | DIASTOLIC BLOOD PRESSURE: 76 MMHG | WEIGHT: 167 LBS | SYSTOLIC BLOOD PRESSURE: 107 MMHG

## 2025-07-30 PROCEDURE — 99215 OFFICE O/P EST HI 40 MIN: CPT

## 2025-07-30 PROCEDURE — G2211 COMPLEX E/M VISIT ADD ON: CPT

## 2025-08-09 ENCOUNTER — NON-APPOINTMENT (OUTPATIENT)
Age: 70
End: 2025-08-09

## 2025-08-11 ENCOUNTER — APPOINTMENT (OUTPATIENT)
Dept: UROLOGY | Facility: CLINIC | Age: 70
End: 2025-08-11
Payer: COMMERCIAL

## 2025-08-11 VITALS
DIASTOLIC BLOOD PRESSURE: 73 MMHG | SYSTOLIC BLOOD PRESSURE: 111 MMHG | BODY MASS INDEX: 26.84 KG/M2 | OXYGEN SATURATION: 100 % | TEMPERATURE: 97.5 F | RESPIRATION RATE: 18 BRPM | WEIGHT: 167 LBS | HEIGHT: 66 IN | HEART RATE: 74 BPM

## 2025-08-11 DIAGNOSIS — E10.628 TYPE 1 DIABETES MELLITUS WITH OTHER SKIN COMPLICATIONS: ICD-10-CM

## 2025-08-11 DIAGNOSIS — Z85.46 PERSONAL HISTORY OF MALIGNANT NEOPLASM OF PROSTATE: ICD-10-CM

## 2025-08-11 DIAGNOSIS — Z86.69 PERSONAL HISTORY OF OTHER DISEASES OF THE NERVOUS SYSTEM AND SENSE ORGANS: ICD-10-CM

## 2025-08-11 DIAGNOSIS — H61.22 IMPACTED CERUMEN, LEFT EAR: ICD-10-CM

## 2025-08-11 DIAGNOSIS — Z86.39 PERSONAL HISTORY OF OTHER ENDOCRINE, NUTRITIONAL AND METABOLIC DISEASE: ICD-10-CM

## 2025-08-11 DIAGNOSIS — Z87.438 PERSONAL HISTORY OF OTHER DISEASES OF MALE GENITAL ORGANS: ICD-10-CM

## 2025-08-11 DIAGNOSIS — E11.628 TYPE 2 DIABETES MELLITUS WITH OTHER SKIN COMPLICATIONS: ICD-10-CM

## 2025-08-11 PROCEDURE — G2211 COMPLEX E/M VISIT ADD ON: CPT

## 2025-08-11 PROCEDURE — 99215 OFFICE O/P EST HI 40 MIN: CPT

## 2025-08-11 RX ORDER — LEVOTHYROXINE SODIUM 0.15 MG/1
150 TABLET ORAL
Refills: 0 | Status: ACTIVE | COMMUNITY

## 2025-08-11 RX ORDER — PANTOPRAZOLE 40 MG/1
40 TABLET, DELAYED RELEASE ORAL DAILY
Qty: 30 | Refills: 2 | Status: COMPLETED | COMMUNITY
Start: 2025-07-23 | End: 2025-08-11

## 2025-08-20 ENCOUNTER — APPOINTMENT (OUTPATIENT)
Dept: OTHER | Facility: CLINIC | Age: 70
End: 2025-08-20
Payer: COMMERCIAL

## 2025-08-20 VITALS
HEART RATE: 76 BPM | DIASTOLIC BLOOD PRESSURE: 73 MMHG | HEIGHT: 66 IN | WEIGHT: 170 LBS | OXYGEN SATURATION: 98 % | BODY MASS INDEX: 27.32 KG/M2 | TEMPERATURE: 98.3 F | SYSTOLIC BLOOD PRESSURE: 112 MMHG | RESPIRATION RATE: 16 BRPM

## 2025-08-20 DIAGNOSIS — G47.33 OBSTRUCTIVE SLEEP APNEA (ADULT) (PEDIATRIC): ICD-10-CM

## 2025-08-20 DIAGNOSIS — Z04.9 ENCOUNTER FOR EXAMINATION AND OBSERVATION FOR UNSPECIFIED REASON: ICD-10-CM

## 2025-08-20 DIAGNOSIS — J32.9 CHRONIC SINUSITIS, UNSPECIFIED: ICD-10-CM

## 2025-08-20 DIAGNOSIS — M77.8 OTHER ENTHESOPATHIES, NOT ELSEWHERE CLASSIFIED: ICD-10-CM

## 2025-08-20 DIAGNOSIS — C61 MALIGNANT NEOPLASM OF PROSTATE: ICD-10-CM

## 2025-08-20 PROCEDURE — 99214 OFFICE O/P EST MOD 30 MIN: CPT | Mod: 25

## 2025-08-20 PROCEDURE — 94010 BREATHING CAPACITY TEST: CPT

## 2025-08-20 PROCEDURE — 99397 PER PM REEVAL EST PAT 65+ YR: CPT | Mod: 25

## 2025-08-22 ENCOUNTER — APPOINTMENT (OUTPATIENT)
Dept: ORTHOPEDIC SURGERY | Facility: CLINIC | Age: 70
End: 2025-08-22
Payer: MEDICARE

## 2025-08-22 ENCOUNTER — NON-APPOINTMENT (OUTPATIENT)
Age: 70
End: 2025-08-22

## 2025-08-22 VITALS — WEIGHT: 170 LBS | HEIGHT: 67 IN | BODY MASS INDEX: 26.68 KG/M2

## 2025-08-22 DIAGNOSIS — M25.512 PAIN IN LEFT SHOULDER: ICD-10-CM

## 2025-08-22 PROCEDURE — 73030 X-RAY EXAM OF SHOULDER: CPT | Mod: LT

## 2025-08-22 PROCEDURE — 20610 DRAIN/INJ JOINT/BURSA W/O US: CPT | Mod: LT

## 2025-08-22 PROCEDURE — 99204 OFFICE O/P NEW MOD 45 MIN: CPT | Mod: 25

## 2025-08-23 ENCOUNTER — RESULT REVIEW (OUTPATIENT)
Age: 70
End: 2025-08-23

## 2025-08-23 ENCOUNTER — OUTPATIENT (OUTPATIENT)
Dept: OUTPATIENT SERVICES | Facility: HOSPITAL | Age: 70
LOS: 1 days | End: 2025-08-23
Payer: COMMERCIAL

## 2025-08-23 ENCOUNTER — APPOINTMENT (OUTPATIENT)
Dept: MRI IMAGING | Facility: IMAGING CENTER | Age: 70
End: 2025-08-23

## 2025-08-23 ENCOUNTER — OUTPATIENT (OUTPATIENT)
Dept: OUTPATIENT SERVICES | Facility: HOSPITAL | Age: 70
LOS: 1 days | End: 2025-08-23

## 2025-08-23 DIAGNOSIS — Z00.8 ENCOUNTER FOR OTHER GENERAL EXAMINATION: ICD-10-CM

## 2025-08-23 DIAGNOSIS — C61 MALIGNANT NEOPLASM OF PROSTATE: ICD-10-CM

## 2025-08-23 DIAGNOSIS — Z98.890 OTHER SPECIFIED POSTPROCEDURAL STATES: Chronic | ICD-10-CM

## 2025-08-23 PROCEDURE — 76498 UNLISTED MR PROCEDURE: CPT

## 2025-08-23 PROCEDURE — A9585: CPT

## 2025-08-23 PROCEDURE — 72197 MRI PELVIS W/O & W/DYE: CPT

## 2025-08-23 PROCEDURE — 76498P: CUSTOM | Mod: 26

## 2025-08-23 PROCEDURE — 72197 MRI PELVIS W/O & W/DYE: CPT | Mod: 26

## 2025-09-05 ENCOUNTER — NON-APPOINTMENT (OUTPATIENT)
Age: 70
End: 2025-09-05

## 2025-09-08 ENCOUNTER — APPOINTMENT (OUTPATIENT)
Dept: UROLOGY | Facility: CLINIC | Age: 70
End: 2025-09-08

## (undated) DEVICE — SNARE LOOP POLY DISP 30MM LOOP

## (undated) DEVICE — VENODYNE/SCD SLEEVE CALF MEDIUM

## (undated) DEVICE — MASK O2 ADLT POM ELITE W/ MED AND HI CONCEN M TO M 10FT

## (undated) DEVICE — SOLIDIFIER ISOLYZER 2000CC

## (undated) DEVICE — FORMALIN CUPS 10% BUFFERED

## (undated) DEVICE — DRSG CURITY GAUZE SPONGE 4 X 4" 12-PLY

## (undated) DEVICE — RADIAL JAW 4 LG CAPACITY WITH NDL

## (undated) DEVICE — FORCEP BIOPSY 2.5MM DISP

## (undated) DEVICE — CONTAINER FORMALIN 10% 20ML

## (undated) DEVICE — TUBING CANNULA SALTER LABS NASAL ADULT 7FT

## (undated) DEVICE — KIT ENDO PROCEDURE CUST W/VLV

## (undated) DEVICE — BITE BLOCK ADULT 20 X 27MM (GREEN)

## (undated) DEVICE — SOL INJ NS 0.9% 500ML 1-PORT

## (undated) DEVICE — SENSOR O2 FINGER ADULT

## (undated) DEVICE — RETRIEVER ROTH NET PLATINUM-UNIVERSAL

## (undated) DEVICE — FORCEP RADIAL JAW 4 W NDL 2.2MM 2.8MM 240CM ORANGE DISP

## (undated) DEVICE — ADAPTER ENDO CHNL SINGLE USE

## (undated) DEVICE — Device

## (undated) DEVICE — TUBING MEDI-VAC W MAXIGRIP CONNECTORS 1/4"X6'

## (undated) DEVICE — CATH ELCTR GLIDE PRB 7FR

## (undated) DEVICE — SOLIDIFIER ISOLYZER 2000 CC

## (undated) DEVICE — SYR LUER LOK 50CC

## (undated) DEVICE — BITE BLOCK MOUTHPCW/STRAP

## (undated) DEVICE — CLAMP BX HOT RAD JAW 3

## (undated) DEVICE — LUBRICATING JELLY ONESHOT 1.25OZ

## (undated) DEVICE — STERIS DEFENDO 3-PIECE KIT (AIR/WATER, SUCTION & BIOPSY VALVES)

## (undated) DEVICE — NDL INJ SCLERO INTERJECT 23G

## (undated) DEVICE — TUBING IV SET GRAVITY 3Y 100" MACRO